# Patient Record
Sex: MALE | Race: BLACK OR AFRICAN AMERICAN | NOT HISPANIC OR LATINO
[De-identification: names, ages, dates, MRNs, and addresses within clinical notes are randomized per-mention and may not be internally consistent; named-entity substitution may affect disease eponyms.]

---

## 2019-08-22 ENCOUNTER — APPOINTMENT (OUTPATIENT)
Dept: SURGERY | Facility: CLINIC | Age: 69
End: 2019-08-22
Payer: MEDICARE

## 2019-08-22 ENCOUNTER — FORM ENCOUNTER (OUTPATIENT)
Age: 69
End: 2019-08-22

## 2019-08-22 VITALS
BODY MASS INDEX: 25.02 KG/M2 | HEIGHT: 68.5 IN | HEART RATE: 79 BPM | WEIGHT: 167 LBS | DIASTOLIC BLOOD PRESSURE: 88 MMHG | SYSTOLIC BLOOD PRESSURE: 161 MMHG

## 2019-08-22 DIAGNOSIS — Z78.9 OTHER SPECIFIED HEALTH STATUS: ICD-10-CM

## 2019-08-22 DIAGNOSIS — Z86.79 PERSONAL HISTORY OF OTHER DISEASES OF THE CIRCULATORY SYSTEM: ICD-10-CM

## 2019-08-22 DIAGNOSIS — Z85.46 PERSONAL HISTORY OF MALIGNANT NEOPLASM OF PROSTATE: ICD-10-CM

## 2019-08-22 DIAGNOSIS — Z87.2 PERSONAL HISTORY OF DISEASES OF THE SKIN AND SUBCUTANEOUS TISSUE: ICD-10-CM

## 2019-08-22 PROCEDURE — 99203 OFFICE O/P NEW LOW 30 MIN: CPT

## 2019-08-22 RX ORDER — SILDENAFIL 100 MG/1
100 TABLET, FILM COATED ORAL
Qty: 6 | Refills: 0 | Status: ACTIVE | COMMUNITY
Start: 2019-07-30

## 2019-08-22 RX ORDER — FEBUXOSTAT 40 MG/1
40 TABLET ORAL
Qty: 30 | Refills: 0 | Status: ACTIVE | COMMUNITY
Start: 2019-08-01

## 2019-08-22 RX ORDER — AMLODIPINE BESYLATE 2.5 MG/1
2.5 TABLET ORAL
Qty: 90 | Refills: 0 | Status: ACTIVE | COMMUNITY
Start: 2019-06-06

## 2019-08-22 RX ORDER — RAMIPRIL 10 MG/1
10 CAPSULE ORAL
Qty: 90 | Refills: 0 | Status: ACTIVE | COMMUNITY
Start: 2019-06-06

## 2019-08-22 RX ORDER — TAMSULOSIN HYDROCHLORIDE 0.4 MG/1
0.4 CAPSULE ORAL
Qty: 90 | Refills: 0 | Status: ACTIVE | COMMUNITY
Start: 2019-08-04

## 2019-08-23 ENCOUNTER — APPOINTMENT (OUTPATIENT)
Dept: CT IMAGING | Facility: IMAGING CENTER | Age: 69
End: 2019-08-23
Payer: MEDICARE

## 2019-08-23 ENCOUNTER — OUTPATIENT (OUTPATIENT)
Dept: OUTPATIENT SERVICES | Facility: HOSPITAL | Age: 69
LOS: 1 days | End: 2019-08-23
Payer: MEDICARE

## 2019-08-23 DIAGNOSIS — Q89.2 CONGENITAL MALFORMATIONS OF OTHER ENDOCRINE GLANDS: ICD-10-CM

## 2019-08-23 PROCEDURE — 70491 CT SOFT TISSUE NECK W/DYE: CPT | Mod: 26

## 2019-08-23 PROCEDURE — 70491 CT SOFT TISSUE NECK W/DYE: CPT

## 2019-08-23 PROCEDURE — 82565 ASSAY OF CREATININE: CPT

## 2019-08-23 NOTE — PHYSICAL EXAM
[de-identified] : 3.5 cm left paramedian upper neck mass overlying thyrohyoid membrane, well circumscribed and mobile [de-identified] : no palpable thyroid nodules [Laryngoscopy Performed] : laryngoscopy was performed, see procedure section for findings [Midline] : located in midline position [Normal] : cranial nerves 2-12 intact [de-identified] : indirect  laryngoscopy shows normal vocal cord mobility bilaterally with no lesions noted

## 2019-08-23 NOTE — CONSULT LETTER
[Dear  ___] : Dear  [unfilled], [Consult Letter:] : I had the pleasure of evaluating your patient, [unfilled]. [Please see my note below.] : Please see my note below. [Consult Closing:] : Thank you very much for allowing me to participate in the care of this patient.  If you have any questions, please do not hesitate to contact me. [Sincerely,] : Sincerely, [FreeTextEntry2] : Dr. Refugio De Leon [FreeTextEntry3] : Josef Hdez MD, FACS\par System Director, Endocrine Surgery\par [facility]\par

## 2019-08-23 NOTE — HISTORY OF PRESENT ILLNESS
[de-identified] : Pt c/o anterior neck mass,  increasing in size.  denies pain, dysphagia or hoarseness, infection or drainage. prior diagnosis of thyroglossal duct cyst

## 2019-09-04 ENCOUNTER — OTHER (OUTPATIENT)
Age: 69
End: 2019-09-04

## 2019-09-19 ENCOUNTER — APPOINTMENT (OUTPATIENT)
Dept: SURGERY | Facility: CLINIC | Age: 69
End: 2019-09-19
Payer: MEDICARE

## 2019-09-19 DIAGNOSIS — Q89.2 CONGENITAL MALFORMATIONS OF OTHER ENDOCRINE GLANDS: ICD-10-CM

## 2019-09-19 PROCEDURE — 99213 OFFICE O/P EST LOW 20 MIN: CPT

## 2019-09-19 NOTE — PHYSICAL EXAM
[de-identified] : 3.5 cm left paramedian upper neck mass overlying thyrohyoid membrane, well circumscribed and mobile.  4 mm right neck intradermal cyst [de-identified] : no palpable thyroid nodules [Laryngoscopy Performed] : laryngoscopy was performed, see procedure section for findings [Midline] : located in midline position [Normal] : orientation to person, place, and time: normal [de-identified] : mild thickening bilateral submandibular salivary glands with clear salivary flow

## 2019-09-19 NOTE — HISTORY OF PRESENT ILLNESS
[de-identified] : prior evaluation of thyroglossal duct cyst, slowly enlarging.  denies pain, infection , dysphagia, hoarseness or new lesions. no changes medically since last visit

## 2019-09-19 NOTE — ASSESSMENT
[FreeTextEntry1] : recommended excision due to enlargement. risks, benefits and alternatives discussed at length. wishes to keep under observation. ramifications discussed. to return earlier if any change.  to maintain hydration to prevent salivary swelling

## 2020-08-13 ENCOUNTER — APPOINTMENT (OUTPATIENT)
Dept: SURGERY | Facility: CLINIC | Age: 70
End: 2020-08-13

## 2022-12-12 ENCOUNTER — EMERGENCY (EMERGENCY)
Facility: HOSPITAL | Age: 72
LOS: 0 days | Discharge: HOME | End: 2022-12-13
Attending: EMERGENCY MEDICINE | Admitting: HOSPITALIST
Payer: MEDICARE

## 2022-12-12 VITALS
HEART RATE: 79 BPM | HEIGHT: 68 IN | OXYGEN SATURATION: 95 % | RESPIRATION RATE: 16 BRPM | DIASTOLIC BLOOD PRESSURE: 83 MMHG | TEMPERATURE: 98 F | WEIGHT: 160.06 LBS | SYSTOLIC BLOOD PRESSURE: 140 MMHG

## 2022-12-12 DIAGNOSIS — M10.9 GOUT, UNSPECIFIED: ICD-10-CM

## 2022-12-12 DIAGNOSIS — I10 ESSENTIAL (PRIMARY) HYPERTENSION: ICD-10-CM

## 2022-12-12 DIAGNOSIS — R55 SYNCOPE AND COLLAPSE: ICD-10-CM

## 2022-12-12 DIAGNOSIS — N18.30 CHRONIC KIDNEY DISEASE, STAGE 3 UNSPECIFIED: ICD-10-CM

## 2022-12-12 DIAGNOSIS — E78.5 HYPERLIPIDEMIA, UNSPECIFIED: ICD-10-CM

## 2022-12-12 DIAGNOSIS — I25.10 ATHEROSCLEROTIC HEART DISEASE OF NATIVE CORONARY ARTERY WITHOUT ANGINA PECTORIS: ICD-10-CM

## 2022-12-12 LAB
ALBUMIN SERPL ELPH-MCNC: 3.6 G/DL — SIGNIFICANT CHANGE UP (ref 3.3–5)
ALP SERPL-CCNC: 65 U/L — SIGNIFICANT CHANGE UP (ref 40–120)
ALT FLD-CCNC: 26 U/L — SIGNIFICANT CHANGE UP (ref 12–78)
ANION GAP SERPL CALC-SCNC: 5 MMOL/L — SIGNIFICANT CHANGE UP (ref 5–17)
APTT BLD: 26.7 SEC — LOW (ref 27.5–35.5)
AST SERPL-CCNC: 34 U/L — SIGNIFICANT CHANGE UP (ref 15–37)
BASOPHILS # BLD AUTO: 0.04 K/UL — SIGNIFICANT CHANGE UP (ref 0–0.2)
BASOPHILS NFR BLD AUTO: 0.5 % — SIGNIFICANT CHANGE UP (ref 0–2)
BILIRUB SERPL-MCNC: 0.6 MG/DL — SIGNIFICANT CHANGE UP (ref 0.2–1.2)
BUN SERPL-MCNC: 26 MG/DL — HIGH (ref 7–23)
CALCIUM SERPL-MCNC: 9.2 MG/DL — SIGNIFICANT CHANGE UP (ref 8.5–10.1)
CHLORIDE SERPL-SCNC: 108 MMOL/L — SIGNIFICANT CHANGE UP (ref 96–108)
CK MB BLD-MCNC: 0.4 % — SIGNIFICANT CHANGE UP (ref 0–3.5)
CK MB CFR SERPL CALC: 6.2 NG/ML — HIGH (ref 0.5–3.6)
CK SERPL-CCNC: 1635 U/L — HIGH (ref 26–308)
CO2 SERPL-SCNC: 29 MMOL/L — SIGNIFICANT CHANGE UP (ref 22–31)
CREAT SERPL-MCNC: 1.67 MG/DL — HIGH (ref 0.5–1.3)
EGFR: 43 ML/MIN/1.73M2 — LOW
EOSINOPHIL # BLD AUTO: 0.04 K/UL — SIGNIFICANT CHANGE UP (ref 0–0.5)
EOSINOPHIL NFR BLD AUTO: 0.5 % — SIGNIFICANT CHANGE UP (ref 0–6)
FLUAV AG NPH QL: SIGNIFICANT CHANGE UP
FLUBV AG NPH QL: SIGNIFICANT CHANGE UP
GLUCOSE BLDC GLUCOMTR-MCNC: 128 MG/DL — HIGH (ref 70–99)
GLUCOSE SERPL-MCNC: 114 MG/DL — HIGH (ref 70–99)
HCT VFR BLD CALC: 40.6 % — SIGNIFICANT CHANGE UP (ref 39–50)
HGB BLD-MCNC: 13.1 G/DL — SIGNIFICANT CHANGE UP (ref 13–17)
IMM GRANULOCYTES NFR BLD AUTO: 0.6 % — SIGNIFICANT CHANGE UP (ref 0–0.9)
INR BLD: 0.93 RATIO — SIGNIFICANT CHANGE UP (ref 0.88–1.16)
LYMPHOCYTES # BLD AUTO: 1.8 K/UL — SIGNIFICANT CHANGE UP (ref 1–3.3)
LYMPHOCYTES # BLD AUTO: 21.5 % — SIGNIFICANT CHANGE UP (ref 13–44)
MCHC RBC-ENTMCNC: 30.8 PG — SIGNIFICANT CHANGE UP (ref 27–34)
MCHC RBC-ENTMCNC: 32.3 G/DL — SIGNIFICANT CHANGE UP (ref 32–36)
MCV RBC AUTO: 95.5 FL — SIGNIFICANT CHANGE UP (ref 80–100)
MONOCYTES # BLD AUTO: 0.52 K/UL — SIGNIFICANT CHANGE UP (ref 0–0.9)
MONOCYTES NFR BLD AUTO: 6.2 % — SIGNIFICANT CHANGE UP (ref 2–14)
NEUTROPHILS # BLD AUTO: 5.92 K/UL — SIGNIFICANT CHANGE UP (ref 1.8–7.4)
NEUTROPHILS NFR BLD AUTO: 70.7 % — SIGNIFICANT CHANGE UP (ref 43–77)
NRBC # BLD: 0 /100 WBCS — SIGNIFICANT CHANGE UP (ref 0–0)
PLATELET # BLD AUTO: 185 K/UL — SIGNIFICANT CHANGE UP (ref 150–400)
POTASSIUM SERPL-MCNC: 4.7 MMOL/L — SIGNIFICANT CHANGE UP (ref 3.5–5.3)
POTASSIUM SERPL-SCNC: 4.7 MMOL/L — SIGNIFICANT CHANGE UP (ref 3.5–5.3)
PROT SERPL-MCNC: 7 GM/DL — SIGNIFICANT CHANGE UP (ref 6–8.3)
PROTHROM AB SERPL-ACNC: 11 SEC — SIGNIFICANT CHANGE UP (ref 10.5–13.4)
RBC # BLD: 4.25 M/UL — SIGNIFICANT CHANGE UP (ref 4.2–5.8)
RBC # FLD: 13 % — SIGNIFICANT CHANGE UP (ref 10.3–14.5)
SARS-COV-2 RNA SPEC QL NAA+PROBE: SIGNIFICANT CHANGE UP
SODIUM SERPL-SCNC: 142 MMOL/L — SIGNIFICANT CHANGE UP (ref 135–145)
TROPONIN I, HIGH SENSITIVITY RESULT: 23.2 NG/L — SIGNIFICANT CHANGE UP
TROPONIN I, HIGH SENSITIVITY RESULT: 40 NG/L — SIGNIFICANT CHANGE UP
WBC # BLD: 8.37 K/UL — SIGNIFICANT CHANGE UP (ref 3.8–10.5)
WBC # FLD AUTO: 8.37 K/UL — SIGNIFICANT CHANGE UP (ref 3.8–10.5)

## 2022-12-12 PROCEDURE — 70450 CT HEAD/BRAIN W/O DYE: CPT | Mod: 26,MA

## 2022-12-12 PROCEDURE — 93880 EXTRACRANIAL BILAT STUDY: CPT | Mod: 26

## 2022-12-12 PROCEDURE — 72100 X-RAY EXAM L-S SPINE 2/3 VWS: CPT | Mod: 26

## 2022-12-12 PROCEDURE — 99285 EMERGENCY DEPT VISIT HI MDM: CPT

## 2022-12-12 PROCEDURE — 99223 1ST HOSP IP/OBS HIGH 75: CPT

## 2022-12-12 PROCEDURE — 71045 X-RAY EXAM CHEST 1 VIEW: CPT | Mod: 26

## 2022-12-12 PROCEDURE — 73502 X-RAY EXAM HIP UNI 2-3 VIEWS: CPT | Mod: 26,RT

## 2022-12-12 PROCEDURE — 93306 TTE W/DOPPLER COMPLETE: CPT | Mod: 26

## 2022-12-12 PROCEDURE — 99222 1ST HOSP IP/OBS MODERATE 55: CPT

## 2022-12-12 PROCEDURE — 93010 ELECTROCARDIOGRAM REPORT: CPT

## 2022-12-12 RX ORDER — ACETAMINOPHEN 500 MG
975 TABLET ORAL ONCE
Refills: 0 | Status: COMPLETED | OUTPATIENT
Start: 2022-12-12 | End: 2022-12-12

## 2022-12-12 RX ORDER — METHOCARBAMOL 500 MG/1
1500 TABLET, FILM COATED ORAL ONCE
Refills: 0 | Status: COMPLETED | OUTPATIENT
Start: 2022-12-12 | End: 2022-12-12

## 2022-12-12 RX ORDER — SODIUM CHLORIDE 9 MG/ML
1000 INJECTION, SOLUTION INTRAVENOUS
Refills: 0 | Status: DISCONTINUED | OUTPATIENT
Start: 2022-12-12 | End: 2022-12-13

## 2022-12-12 RX ORDER — ATORVASTATIN CALCIUM 80 MG/1
40 TABLET, FILM COATED ORAL AT BEDTIME
Refills: 0 | Status: DISCONTINUED | OUTPATIENT
Start: 2022-12-12 | End: 2022-12-13

## 2022-12-12 RX ORDER — METOPROLOL TARTRATE 50 MG
100 TABLET ORAL DAILY
Refills: 0 | Status: DISCONTINUED | OUTPATIENT
Start: 2022-12-12 | End: 2022-12-13

## 2022-12-12 RX ORDER — AMLODIPINE BESYLATE 2.5 MG/1
2.5 TABLET ORAL DAILY
Refills: 0 | Status: DISCONTINUED | OUTPATIENT
Start: 2022-12-12 | End: 2022-12-13

## 2022-12-12 RX ORDER — ACETAMINOPHEN 500 MG
650 TABLET ORAL EVERY 6 HOURS
Refills: 0 | Status: DISCONTINUED | OUTPATIENT
Start: 2022-12-12 | End: 2022-12-13

## 2022-12-12 RX ORDER — LANOLIN ALCOHOL/MO/W.PET/CERES
3 CREAM (GRAM) TOPICAL AT BEDTIME
Refills: 0 | Status: DISCONTINUED | OUTPATIENT
Start: 2022-12-12 | End: 2022-12-13

## 2022-12-12 RX ORDER — ASPIRIN/CALCIUM CARB/MAGNESIUM 324 MG
81 TABLET ORAL DAILY
Refills: 0 | Status: DISCONTINUED | OUTPATIENT
Start: 2022-12-12 | End: 2022-12-13

## 2022-12-12 RX ADMIN — Medication 975 MILLIGRAM(S): at 11:12

## 2022-12-12 RX ADMIN — METHOCARBAMOL 1500 MILLIGRAM(S): 500 TABLET, FILM COATED ORAL at 11:12

## 2022-12-12 RX ADMIN — Medication 81 MILLIGRAM(S): at 18:56

## 2022-12-12 RX ADMIN — SODIUM CHLORIDE 125 MILLILITER(S): 9 INJECTION, SOLUTION INTRAVENOUS at 18:57

## 2022-12-12 RX ADMIN — Medication 650 MILLIGRAM(S): at 22:13

## 2022-12-12 RX ADMIN — AMLODIPINE BESYLATE 2.5 MILLIGRAM(S): 2.5 TABLET ORAL at 18:55

## 2022-12-12 RX ADMIN — Medication 975 MILLIGRAM(S): at 13:15

## 2022-12-12 RX ADMIN — Medication 650 MILLIGRAM(S): at 21:13

## 2022-12-12 RX ADMIN — ATORVASTATIN CALCIUM 40 MILLIGRAM(S): 80 TABLET, FILM COATED ORAL at 21:13

## 2022-12-12 RX ADMIN — Medication 100 MILLIGRAM(S): at 19:00

## 2022-12-12 NOTE — H&P ADULT - NSHPPHYSICALEXAM_GEN_ALL_CORE
CONSTITUTIONAL: alert and cooperative, no acute distress.   EYES: PERRL, no scleral icterus  ENT: Mucosa moist, tongue normal.   NECK: Neck supple, trachea midline, non-tender  CARDIAC: Normal S1 and S2. Regular rate and rhythms. No murmurs. No Pedal edema. Peripheral pulses intact  LUNGS: Clear to auscultation, equal air entry both lungs. No rales, rhonchi, wheezing. Normal respiratory effort.   ABDOMEN: Soft, nondistended, nontender. No guarding or rebound tenderness. No hepatomegaly or splenomegaly. Bowel sound normal.   MUSCULOSKELETAL: Normocephalic, atraumatic. No significant deformity or joint abnormality  NEUROLOGICAL: No gross motor or sensory deficits  SKIN: no lesions or eruptions. Normal turgor  PSYCHIATRIC: A&O x 3, appropriate mood and affect

## 2022-12-12 NOTE — H&P ADULT - ASSESSMENT
72 years old male with h/o HTN, HLD, CAD, h/o prostate cancer, gout present to ED with syncope. Patient had one episode of syncope after bathroom use ( urination). He did not remember the event. Significant others heard a thump in bathroom and when she arrived, patient was trying to get up. Appear slightly confused for half an hour. No h/o seizure. Reported intermittent lightheadedness recently. No exertional chest pain or SOB.   Hemodynamically stable, afebrile, sat well at RA. EKG with NSR. No leukocytosis, plt 185, K 4.7, Cr 1.67, glucose 114, hsTnT 23.2, flu/COVID negative. CT head image reviewed, no acute pathology. CXR image reviewed, no focal consolidation.     Admitted with syncope

## 2022-12-12 NOTE — H&P ADULT - NSVTERISKASSESS_GEN_ALL_CORE FT
Drink plenty of water    follow up if you experiencing kidney pain/fevers/vomiting.        
Medical Assessment Completed on: 12-Dec-2022 12:39

## 2022-12-12 NOTE — H&P ADULT - PROBLEM SELECTOR PLAN 1
present with syncope, no h/o syncope before  CAD, per [facility] document, " CT angio reviewed with 50-70% occusion mid LAD. Despite this patient is asymptomatic with exercise. No intervention at this time but maximal medical management. If patient becomes symptomatic from CAD will reevaluate for intervention" unclear when CTA was done  Serial trop, CK, CKMB, lipid panel, A1c  ECHO, carotid doppler, telemetry monitoring  Check orthostatic vital  Cardiology consulted- Dr Brenner present with syncope, no h/o syncope before  CAD, per [facility] document, " CT angio reviewed with 50-70% occusion mid LAD. Despite this patient is asymptomatic with exercise. No intervention at this time but maximal medical management. If patient becomes symptomatic from CAD will reevaluate for intervention" unclear when CTA was done  Serial trop, CK, CKMB, lipid panel, A1c  ECHO, carotid doppler, telemetry monitoring  Check orthostatic vital  Not on any antiplatelets per patient. Start aspirin 81mg daily   Cardiology consulted- Dr Brenner

## 2022-12-12 NOTE — ED ADULT NURSE NOTE - OBJECTIVE STATEMENT
Received patient from triage found laying supine, head elevated, on stretcher.  Patient states he got up to use the bathroom, urinated, and then had a syncopal episode.  States his family member heard him fall and called EMS.  Patient does not recall falling, and denies any current chest pain, SOB, N/V/D.  Hx HTN, Gout.

## 2022-12-12 NOTE — CONSULT NOTE ADULT - SUBJECTIVE AND OBJECTIVE BOX
CARDIOLOGY CONSULTATION NOTE                                                                               ERIKA ORDONEZ is a 72y Male   HPI:      REVIEW OF SYSTEMS:  -----------------------------    CONSTITUTIONAL: No fever, weight loss, or fatigue  EYES: No eye pain, visual disturbances, or discharge  ENMT:  No difficulty hearing, tinnitus, vertigo; No sinus or throat pain  NECK: No pain or stiffness  BREASTS: No pain, masses, or nipple discharge  RESPIRATORY: No cough, wheezing, chills or hemoptysis; No shortness of breath  CARDIOVASCULAR: See HPI  GASTROINTESTINAL: No abdominal or epigastric pain. No nausea, vomiting, or hematemesis; No diarrhea or constipation. No melena or hematochezia.  GENITOURINARY: No dysuria, frequency, hematuria, or incontinence  NEUROLOGICAL: No headaches, memory loss, loss of strength, numbness, or tremors  SKIN: No itching, burning, rashes, or lesions   LYMPH NODES: No enlarged glands  ENDOCRINE: No heat or cold intolerance; No hair loss  MUSCULOSKELETAL: No joint pain or swelling; No muscle, back, or extremity pain  PSYCHIATRIC: No depression, anxiety, mood swings, or difficulty sleeping  HEME/LYMPH: No easy bruising, or bleeding gums  ALLERGY AND IMMUNOLOGIC: No hives or eczema    Home Medications:  amLODIPine 2.5 mg oral tablet: 1 tab(s) orally once a day (12 Dec 2022 12:41)  Metoprolol Succinate ER 50 mg oral tablet, extended release: 2 tab(s) orally once a day (12 Dec 2022 12:42)  ramipril 10 mg oral capsule: 1 cap(s) orally once a day (12 Dec 2022 12:42)  rosuvastatin 10 mg oral tablet: 1 tab(s) orally once a day (12 Dec 2022 12:42)      MEDICATIONS  (STANDING):  amLODIPine   Tablet 2.5 milliGRAM(s) Oral daily  aspirin enteric coated 81 milliGRAM(s) Oral daily  atorvastatin 40 milliGRAM(s) Oral at bedtime  metoprolol succinate  milliGRAM(s) Oral daily      ALLERGIES: No Known Allergies      FAMILY HISTORY:      PHYSICAL EXAMINATION:  -----------------------------  T(C): 36.4 (12-12-22 @ 07:07), Max: 36.4 (12-12-22 @ 07:07)  HR: 79 (12-12-22 @ 07:07) (79 - 79)  BP: 140/83 (12-12-22 @ 07:07) (140/83 - 140/83)  RR: 16 (12-12-22 @ 07:07) (16 - 16)  SpO2: 95% (12-12-22 @ 07:07) (95% - 95%)  Wt(kg): --    Height (cm): 172.7 (12-12 @ 07:07)  Weight (kg): 72.6 (12-12 @ 07:07)  BMI (kg/m2): 24.3 (12-12 @ 07:07)  BSA (m2): 1.86 (12-12 @ 07:07)    Constitutional: well developed, normal appearance, well groomed, well nourished, no deformities and no acute distress.   Eyes: the conjunctiva exhibited no abnormalities and the eyelids demonstrated no xanthelasmas.   HEENT: normal oral mucosa, no oral pallor and no oral cyanosis.   Neck: normal jugular venous A waves present, normal jugular venous V waves present and no jugular venous ortiz A waves.   Pulmonary: no respiratory distress, normal respiratory rhythm and effort, no accessory muscle use and lungs were clear to auscultation bilaterally.   Cardiovascular: heart rate and rhythm were normal, normal S1 and S2 and no murmur, gallop, rub, heave or thrill are present.   Abdomen: soft, non-tender, no hepato-splenomegaly and no abdominal mass palpated.   Musculoskeletal: the gait could not be assessed..   Extremities: no clubbing of the fingernails, no localized cyanosis, no petechial hemorrhages and no ischemic changes.   Skin: normal skin color and pigmentation, no rash, no venous stasis, no skin lesions, no skin ulcer and no xanthoma was observed.   Psychiatric: oriented to person, place, and time, the affect was normal, the mood was normal and not feeling anxious.     ECG:  -------        LABS:   --------  12-12    142  |  108  |  26<H>  ----------------------------<  114<H>  4.7   |  29  |  1.67<H>    Ca    9.2      12 Dec 2022 10:40    TPro  7.0  /  Alb  3.6  /  TBili  0.6  /  DBili  x   /  AST  34  /  ALT  26  /  AlkPhos  65  12-12                         13.1   8.37  )-----------( 185      ( 12 Dec 2022 10:40 )             40.6     PT/INR - ( 12 Dec 2022 10:40 )   PT: 11.0 sec;   INR: 0.93 ratio         PTT - ( 12 Dec 2022 10:40 )  PTT:26.7 sec            RADIOLOGY REPORTS:  -----------------------------        ECHOCARDIOGRAM:  ---------------------------        CARDIOLOGY CONSULTATION NOTE                                                                             72 years old male with h/o HTN, HLD, CAD, h/o prostate cancer, gout present to ED with syncope. Patient had one episode of syncope after bathroom use ( urination). He did not remember the event. Significant others heard a thump in bathroom and when she arrived, patient was trying to get up. Appear slightly confused for half an hour. No h/o seizure. Reported intermittent lightheadedness recently. No exertional chest pain or SOB.  Hemodynamically stable, afebrile, sat well at RA. EKG with NSR. No leukiocytosis, plt 185, K 4.7, Cr 1.67, glucose 114, hsTnT 23.2, flu/COVID negative. CT head image reviewed, no acute pathology. CXR image reviewed, no focal consolidation.   Patient normally follows at [facility]. Per Jillian ABEL, there is a document stating " CT angio reviewed with 50-70% occlusion mid LAD; despite this patient is asymptomatic with exercise; discussed with patient; no intervention at this time but maximal medical management, if patient becomes symptomatic from CAD will reevaluate for intervention". Unclear when CTA was done  SH: marijuana/alcohol use occasionally  FH: no family h/o premature CAD  REVIEW OF SYSTEMS: -----------------------------  CONSTITUTIONAL: No fever, weight loss, or fatigue EYES: No eye pain, visual disturbances, or discharge ENMT:  No difficulty hearing, tinnitus, vertigo; No sinus or throat pain NECK: No pain or stiffness BREASTS: No pain, masses, or nipple discharge RESPIRATORY: No cough, wheezing, chills or hemoptysis; No shortness of breath CARDIOVASCULAR: See HPI GASTROINTESTINAL: No abdominal or epigastric pain. No nausea, vomiting, or hematemesis; No diarrhea or constipation. No melena or hematochezia. GENITOURINARY: No dysuria, frequency, hematuria, or incontinence NEUROLOGICAL: No headaches, memory loss, loss of strength, numbness, or tremors SKIN: No itching, burning, rashes, or lesions  LYMPH NODES: No enlarged glands ENDOCRINE: No heat or cold intolerance; No hair loss MUSCULOSKELETAL: No joint pain or swelling; No muscle, back, or extremity pain PSYCHIATRIC: No depression, anxiety, mood swings, or difficulty sleeping HEME/LYMPH: No easy bruising, or bleeding gums ALLERGY AND IMMUNOLOGIC: No hives or eczema  Home Medications: amLODIPine 2.5 mg oral tablet: 1 tab(s) orally once a day (12 Dec 2022 12:41) Metoprolol Succinate ER 50 mg oral tablet, extended release: 2 tab(s) orally once a day (12 Dec 2022 12:42) ramipril 10 mg oral capsule: 1 cap(s) orally once a day (12 Dec 2022 12:42) rosuvastatin 10 mg oral tablet: 1 tab(s) orally once a day (12 Dec 2022 12:42)   MEDICATIONS  (STANDING): amLODIPine   Tablet 2.5 milliGRAM(s) Oral daily aspirin enteric coated 81 milliGRAM(s) Oral daily atorvastatin 40 milliGRAM(s) Oral at bedtime metoprolol succinate  milliGRAM(s) Oral daily   ALLERGIES: No Known Allergies   FAMILY HISTORY:   PHYSICAL EXAMINATION: ----------------------------- T(C): 36.4 (12-12-22 @ 07:07), Max: 36.4 (12-12-22 @ 07:07) HR: 79 (12-12-22 @ 07:07) (79 - 79) BP: 140/83 (12-12-22 @ 07:07) (140/83 - 140/83) RR: 16 (12-12-22 @ 07:07) (16 - 16) SpO2: 95% (12-12-22 @ 07:07) (95% - 95%) Wt(kg): --  Height (cm): 172.7 (12-12 @ 07:07) Weight (kg): 72.6 (12-12 @ 07:07) BMI (kg/m2): 24.3 (12-12 @ 07:07) BSA (m2): 1.86 (12-12 @ 07:07)  Constitutional: well developed, normal appearance, well groomed, well nourished, no deformities and no acute distress.  Eyes: the conjunctiva exhibited no abnormalities and the eyelids demonstrated no xanthelasmas.  HEENT: normal oral mucosa, no oral pallor and no oral cyanosis.  Neck: normal jugular venous A waves present, normal jugular venous V waves present and no jugular venous ortiz A waves.  Pulmonary: no respiratory distress, normal respiratory rhythm and effort, no accessory muscle use and lungs were clear to auscultation bilaterally.  Cardiovascular: heart rate and rhythm were normal, normal S1 and S2 and no murmur, gallop, rub, heave or thrill are present.  Abdomen: soft, non-tender, no hepato-splenomegaly and no abdominal mass palpated.  Musculoskeletal: the gait could not be assessed..  Extremities: no clubbing of the fingernails, no localized cyanosis, no petechial hemorrhages and no ischemic changes.  Skin: normal skin color and pigmentation, no rash, no venous stasis, no skin lesions, no skin ulcer and no xanthoma was observed.  Psychiatric: oriented to person, place, and time, the affect was normal, the mood was normal and not feeling anxious.   ECG: -------    LABS:  -------- 12-12  142  |  108  |  26<H> ----------------------------<  114<H> 4.7   |  29  |  1.67<H>  Ca    9.2      12 Dec 2022 10:40  TPro  7.0  /  Alb  3.6  /  TBili  0.6  /  DBili  x   /  AST  34  /  ALT  26  /  AlkPhos  65  12-12                       13.1  8.37  )-----------( 185      ( 12 Dec 2022 10:40 )            40.6    PT/INR - ( 12 Dec 2022 10:40 )   PT: 11.0 sec;   INR: 0.93 ratio      PTT - ( 12 Dec 2022 10:40 )  PTT:26.7 sec      RADIOLOGY REPORTS: -----------------------------    ECHOCARDIOGRAM: ---------------------------      CARDIOLOGY CONSULTATION NOTE                                                                             72 years old male with h/o HTN, HLD, CAD, h/o prostate cancer, gout present to ED with syncope. Patient had one episode of syncope after bathroom use ( urination). He did not remember the event. Significant others heard a thump in bathroom and when she arrived, patient was trying to get up. Appear slightly confused for half an hour. No h/o seizure. Reported intermittent lightheadedness recently. No exertional chest pain or SOB.  Hemodynamically stable, afebrile, sat well at RA. EKG with NSR. No leukocytosis, plt 185, K 4.7, Cr 1.67, glucose 114, hsTnT 23.2, flu/COVID negative. CT head image reviewed, no acute pathology. CXR image reviewed, no focal consolidation.   Patient normally follows at [facility]. Per Jillian ABEL, there is a document stating " CT angio reviewed with 50-70% occlusion mid LAD; despite this patient is asymptomatic with exercise; discussed with patient; no intervention at this time but maximal medical management, if patient becomes symptomatic from CAD will reevaluate for intervention". Unclear when CTA was done  Otherwise quite active, avikalen prado. No other focal complaints. Follows with Dr. Refugio De Leon.  SH: marijuana/alcohol use occasionally  FH: no family h/o premature CAD  REVIEW OF SYSTEMS: -----------------------------  CONSTITUTIONAL: No fever, weight loss, or fatigue EYES: No eye pain, visual disturbances, or discharge ENMT:  No difficulty hearing, tinnitus, vertigo; No sinus or throat pain NECK: No pain or stiffness BREASTS: No pain, masses, or nipple discharge RESPIRATORY: No cough, wheezing, chills or hemoptysis; No shortness of breath CARDIOVASCULAR: See HPI GASTROINTESTINAL: No abdominal or epigastric pain. No nausea, vomiting, or hematemesis; No diarrhea or constipation. No melena or hematochezia. GENITOURINARY: No dysuria, frequency, hematuria, or incontinence NEUROLOGICAL: No headaches, memory loss, loss of strength, numbness, or tremors SKIN: No itching, burning, rashes, or lesions  LYMPH NODES: No enlarged glands ENDOCRINE: No heat or cold intolerance; No hair loss MUSCULOSKELETAL: No joint pain or swelling; No muscle, back, or extremity pain PSYCHIATRIC: No depression, anxiety, mood swings, or difficulty sleeping HEME/LYMPH: No easy bruising, or bleeding gums ALLERGY AND IMMUNOLOGIC: No hives or eczema  Home Medications: amLODIPine 2.5 mg oral tablet: 1 tab(s) orally once a day (12 Dec 2022 12:41) Metoprolol Succinate ER 50 mg oral tablet, extended release: 2 tab(s) orally once a day (12 Dec 2022 12:42) ramipril 10 mg oral capsule: 1 cap(s) orally once a day (12 Dec 2022 12:42) rosuvastatin 10 mg oral tablet: 1 tab(s) orally once a day (12 Dec 2022 12:42)   MEDICATIONS  (STANDING): amLODIPine   Tablet 2.5 milliGRAM(s) Oral daily aspirin enteric coated 81 milliGRAM(s) Oral daily atorvastatin 40 milliGRAM(s) Oral at bedtime metoprolol succinate  milliGRAM(s) Oral daily   ALLERGIES: No Known Allergies   FAMILY HISTORY: NC   PHYSICAL EXAMINATION: ----------------------------- T(C): 36.4 (22 @ 07:07), Max: 36.4 (22 @ 07:07) HR: 79 (22 @ 07:07) (79 - 79) BP: 140/83 (22 @ 07:07) (140/83 - 140/83) RR: 16 (22 @ 07:07) (16 - 16) SpO2: 95% (22 @ 07:07) (95% - 95%) Wt(kg): --  Height (cm): 172.7 ( 07:07) Weight (kg): 72.6 ( 07:07) BMI (kg/m2): 24.3 (:07) BSA (m2): 1.86 (:07)  Constitutional: well developed, normal appearance, well groomed, well nourished, no deformities and no acute distress.  Eyes: the conjunctiva exhibited no abnormalities and the eyelids demonstrated no xanthelasmas.  HEENT: normal oral mucosa, no oral pallor and no oral cyanosis.  Neck: normal jugular venous A waves present, normal jugular venous V waves present and no jugular venous ortiz A waves.  Pulmonary: no respiratory distress, normal respiratory rhythm and effort, no accessory muscle use and lungs were clear to auscultation bilaterally.  Cardiovascular: heart rate and rhythm were normal, normal S1 and S2 and no gallop, rub, heave or thrill are present. 2/6 systolic ejection murmur appreciated in LLSB, non radiating. Abdomen: soft, non-tender, no hepato-splenomegaly and no abdominal mass palpated.  Musculoskeletal: the gait could not be assessed..  Extremities: no clubbing of the fingernails, no localized cyanosis, no petechial hemorrhages and no ischemic changes.  Skin: normal skin color and pigmentation, no rash, no venous stasis, no skin lesions, no skin ulcer and no xanthoma was observed.  Psychiatric: oriented to person, place, and time, the affect was normal, the mood was normal and not feeling anxious.   ECG: ------- `< from: 12 Lead ECG (22 @ 07:34) >  Ventricular Rate 71 BPM  Atrial Rate 71 BPM  P-R Interval 140 ms  QRS Duration 94 ms  Q-T Interval 410 ms  QTC Calculation(Bazett) 445 ms  P Axis 63 degrees  R Axis -12 degrees  T Axis -9 degrees  Diagnosis Line Normal sinus rhythm Minimal voltage criteria for LVH, may be normal variant Borderline ECG No previous ECGs available Confirmed by Misael Brenner MD ([id number]) on 2022 10:21:38 PM  < end of copied text >    LABS:  --------   142  |  108  |  26<H> ----------------------------<  114<H> 4.7   |  29  |  1.67<H>  Ca    9.2      12 Dec 2022 10:40  TPro  7.0  /  Alb  3.6  /  TBili  0.6  /  DBili  x   /  AST  34  /  ALT  26  /  AlkPhos  65                         13.1  8.37  )-----------( 185      ( 12 Dec 2022 10:40 )            40.6    PT/INR - ( 12 Dec 2022 10:40 )   PT: 11.0 sec;   INR: 0.93 ratio      PTT - ( 12 Dec 2022 10:40 )  PTT:26.7 sec      RADIOLOGY REPORTS: -----------------------------    ECHOCARDIOGRAM: ---------------------------  < from: TTE Echo Complete w/o Contrast w/ Doppler (22 @ 17:37) >  ACC: [id number] EXAM:  ECHO TTE WO CON COMP W DOPP                        PROCEDURE DATE:  2022      INTERPRETATION:  TRANSTHORACIC ECHOCARDIOGRAM REPORT    Patient Name:   ERIKA ORDONEZ Patient Location: [facility] Rec #:  [id number]     Accession #:      [id number] Account #:                     Height:           67.7 in 172.0 cm YOB: 1950       Weight:           160.9 lb 73.00 kg Patient Age:    72 years       BSA:              1.86 m² Patient Gender: M      BP:               140/83 mmHg   Date of Exam:        2022 5:37:56 PM Sonographer:         STACIA Referring Physician: MANSI ANTONIO  Procedure:   2D Echo/Doppler/Color Doppler Complete. Indications: Abnormal electrocardiogram [ECG] [EKG] - R94.31 Diagnosis:   Abnormal electrocardiogram [ECG] [EKG] - R94.31    2D AND M-MODE MEASUREMENTS (normal ranges within parentheses): Left                 Normal   Aorta/Left            Normal Ventricle:                    Atrium: IVSd(2D):    1.12  (0.7-1.1) Aortic Root    3.70  (2.4-3.7)                cm             (2D):           cm LVPWd (2D):   1.13  (0.7-1.1) Left Atrium    3.96  (1.9-4.0)                cm             (2D):           cm LVIDd (2D):   5.39  (3.4-5.7) LA Vol Index   62.9                cm             (A4C):        ml/m² LVIDs (2D):   4.03            LA Vol Index   79.8                cm             (A2C):        ml/m² LV FS (2D):   25.2   (>25%)   LA Vol Index   74.3                 %  (BP):         ml/m² Relative Wall 0.42   (<0.42)  Right Thickness                     Ventricle:                               TAPSE:          2.25 cm  LV DIASTOLIC FUNCTION: MV Peak E: 0.73 m/s Decel Time:  199 msec MV Peak A: 0.89 m/s Septal E/e'  15.2 E/A Ratio: 0.82     Lateral E/e' 10.4 Septal e'  0.0 m/s Lateral e' 0.1 m/s  SPECTRAL DOPPLER ANALYSIS (where applicable): Mitral Valve: MV P1/2 Time: 57.68 msec MV Area, PHT: 3.81 cm²  Aortic Valve: AoV Max Jeremiah: 1.66 m/s AoV Peak P.1 mmHg AoV Mean P.0 mmHg  LVOT Vmax: 0.79 m/s LVOT VTI: 0.168 m LVOT Diameter: 2.19 cm  AoV Area, Vmax: 1.79 cm² AoV Area, VTI: 2.02 cm² AoV Area, Vmn: 1.60 cm²  Aortic Insufficiency: AI Half-time:  641 msec AI Decel Rate: 2.17 m/s²  Tricuspid Valve and PA/RV Systolic Pressure: TR Max Velocity: 2.32 m/s RA  Pressure: 5 mmHg RVSP/PASP: 26.5 mmHg   PHYSICIAN INTERPRETATION: Left Ventricle: Normal left ventricular size and wall thicknesses, with  normal systolic and diastolic function. Global LV systolic function was normal. Left ventricular ejection  fraction, by visual estimation, is 55 to 60%. Right Ventricle: Normal right ventricular size and function. Left Atrium: Moderately enlarged left atrium. Right Atrium: The right atrium is normal in size. Pericardium: There is no evidence of pericardial effusion. Mitral Valve: Structurally normal mitral valve, with normal leaflet  excursion. Moderate mitral valve regurgitation is seen. Tricuspid Valve: Structurally normal tricuspid valve, with normal leaflet  excursion. Mild tricuspid regurgitation is visualized. Aortic Valve: Normal trileaflet aortic valve with normal opening.  Moderate aortic valve regurgitation is seen. Pulmonic Valve: Structurally normal pulmonic valve, with normal leaflet  excursion. Mild pulmonic valve regurgitation. Aorta: The aortic root and ascending aorta are structurally normal, with  no evidence of dilitation. Pulmonary Artery: The main pulmonary artery is normal in size.   Summary:  1. Left ventricular ejection fraction, by visual estimation, is 55 to  60%.  2. Normal global left ventricular systolic function.  3. Moderately enlarged left atrium.  4. Moderate mitral valve regurgitation.  5. Mild tricuspid regurgitation.  6. Moderate aortic regurgitation.  7. Mild pulmonic valve regurgitation.   [id number] Misael Brenner MD, [facility] Electronically signed on 2022 at 9:41:46 PM      *** Final ***  < end of copied text >

## 2022-12-12 NOTE — ED PROVIDER NOTE - OBJECTIVE STATEMENT
72 year old male with PMH of HTN, HLD, Gout hx otherwise presenting to ED after episode of unconsciousness this morning - girlfriend states that she was making coffee when she heard a thump in the bathroom. Immediate arriving to find pt trying to get up - was unable to do so and exhibited 30 minutes of confusion thereafter. Pt currently in triage A&Ox3 and otherwise denies any discomfort. Denies any CP or palpitations, states he had been having mild nonspecific weakness/dizziness for past year.

## 2022-12-12 NOTE — H&P ADULT - PROBLEM SELECTOR PLAN 2
Follows at [facility]  CAD, per [facility] document, " CT angio reviewed with 50-70% occusion mid LAD. Despite this patient is asymptomatic with exercise. No intervention at this time but maximal medical management. If patient becomes symptomatic from CAD will reevaluate for intervention" unclear when CTA was done  ECHO, carotid doppler, telemetry monitoring  Cardiology consulted- Dr Brenner Follows at [facility]  CAD, per [facility] document, " CT angio reviewed with 50-70% occusion mid LAD. Despite this patient is asymptomatic with exercise. No intervention at this time but maximal medical management. If patient becomes symptomatic from CAD will reevaluate for intervention" unclear when CTA was done  ECHO, carotid doppler, telemetry monitoring  Not on any antiplatelets per patient. Start aspirin 81mg daily, continue statin  Cardiology consulted- Dr Brenner

## 2022-12-12 NOTE — ED ADULT TRIAGE NOTE - CHIEF COMPLAINT QUOTE
biba,  pt found on floor by wife, pt does not recall falling.   wife found him trying to get up.   unknown if hit head.  denies anticoag.  per EMS pt was confused,  .  pt ambulated into ED.  pt c/o R-hip pain.  (PMH-HTN).  pt aaox4 in triage.

## 2022-12-12 NOTE — H&P ADULT - PROBLEM SELECTOR PLAN 4
Fax received from [employer] to report Omeprazole DR 20 mg Capsule has a quantity limit.  They authorized a temporary 30-day supply of the medication on 2/24/17.  Insurance is requesting that we call [employer] at [phone] for a coverage review for this medication.     omeprazole (PRILOSEC) 20 MG capsule 180 capsule 3 2/24/2017       Sig - Route: Take 2 capsules by mouth daily. - Oral      I called insurance and representative reports medication, Omeprazole 20 mg, would be covered if she was taking one pill daily versus 2 capsules daily.  I inquired if Omeprazole 40 mg take one capsule daily would be covered by patient's insurance and she states it is if it is for one capsule daily.      I called patient who reports she has been taking Omeprazole 20 mg, one capsule before lunch and one capsule before supper.  I informed her of the information from the insurance and she is in agreement to try Omeprazole 40 mg one capsule daily.  I informed her that Dr. Browne is out of the office today, but he will address this tomorrow when he is back in the office.  Patient verbalized appreciation.    Dr. Browne, the updated script is pending your approval, which will be covered by patient's insurance.  
Patient notified the script was sent to her local pharmacy, [facility].  
Script done,should [facility]?  
Per document, has CKD 3, no Cr in system  Monitor renal function  If Cr stable and BP elevated, resume ACEI

## 2022-12-12 NOTE — H&P ADULT - HISTORY OF PRESENT ILLNESS
72 years old male with h/o HTN, HLD, CAD, h/o prostate cancer, gout present to ED with syncope. Patient had one episode of syncope after bathroom use ( urination). He did not remember the event. Significant others heard a thump in bathroom and when she arrived, patient was trying to get up. Appear slightly confused for half an hour. No h/o seizure. Reported intermittent lightheadedness recently. No exertional chest pain or SOB.   Hemodynamically stable, afebrile, sat well at RA. EKG with NSR. No leukiocytosis, plt 185, K 4.7, Cr 1.67, glucose 114, hsTnT 23.2, flu/COVID negative. CT head image reviewed, no acute pathology. CXR image reviewed, no focal consolidation.     Patient normally follows at [facility]. Per Jillian ABEL, there is a document stating " CT angio reviewed with 50-70% occlusion mid LAD; despite this patient is asymptomatic with exercise; discussed with patient; no intervention at this time but maximal medical management, if patient becomes symptomatic from CAD will reevaluate for intervention". Unclear when CTA was done    SH: marijuana/alcohol use occasionally   FH: no family h/o premature CAD

## 2022-12-12 NOTE — CONSULT NOTE ADULT - ASSESSMENT
The chart has been reviewed but the patient has not yet been examined.  Full note to follow. 71 yo male with possible micturition syncope. Questionable history of CAD on CTA (source unclear), no active ischemic changes on ECG.  Echo shows normal LVEF but has moderate MR. Carotd study unremarkable.  H/O HTN, HLD.    Admit for 24 hours observation and tele monitoring.  Lengthy syncope doubt ischemic or arrhythmia, neuro evaluation to r/o seizure?  Ambulate and assess for orthostatics.  Out patient ischemia evaluation if ECG and trops normal.

## 2022-12-12 NOTE — ED PROVIDER NOTE - CLINICAL SUMMARY MEDICAL DECISION MAKING FREE TEXT BOX
pt with syncope noted without prodrome and otherwise pt feeling well in ED - will workup  - pt with syncope noted without prodrome and otherwise pt feeling well in ED - will workup  - and will admit for further care with Dr Herring

## 2022-12-13 ENCOUNTER — TRANSCRIPTION ENCOUNTER (OUTPATIENT)
Age: 72
End: 2022-12-13

## 2022-12-13 VITALS
HEART RATE: 78 BPM | RESPIRATION RATE: 20 BRPM | TEMPERATURE: 98 F | SYSTOLIC BLOOD PRESSURE: 158 MMHG | DIASTOLIC BLOOD PRESSURE: 99 MMHG

## 2022-12-13 LAB
A1C WITH ESTIMATED AVERAGE GLUCOSE RESULT: 4.7 % — SIGNIFICANT CHANGE UP (ref 4–5.6)
ALBUMIN SERPL ELPH-MCNC: 3.2 G/DL — LOW (ref 3.3–5)
ALP SERPL-CCNC: 62 U/L — SIGNIFICANT CHANGE UP (ref 40–120)
ALT FLD-CCNC: 22 U/L — SIGNIFICANT CHANGE UP (ref 12–78)
ANION GAP SERPL CALC-SCNC: 5 MMOL/L — SIGNIFICANT CHANGE UP (ref 5–17)
APPEARANCE UR: CLEAR — SIGNIFICANT CHANGE UP
AST SERPL-CCNC: 68 U/L — HIGH (ref 15–37)
BACTERIA # UR AUTO: ABNORMAL
BILIRUB SERPL-MCNC: 1.2 MG/DL — SIGNIFICANT CHANGE UP (ref 0.2–1.2)
BILIRUB UR-MCNC: NEGATIVE — SIGNIFICANT CHANGE UP
BUN SERPL-MCNC: 19 MG/DL — SIGNIFICANT CHANGE UP (ref 7–23)
CALCIUM SERPL-MCNC: 8.4 MG/DL — LOW (ref 8.5–10.1)
CHLORIDE SERPL-SCNC: 108 MMOL/L — SIGNIFICANT CHANGE UP (ref 96–108)
CHOLEST SERPL-MCNC: 162 MG/DL — SIGNIFICANT CHANGE UP
CK MB BLD-MCNC: 0.2 % — SIGNIFICANT CHANGE UP (ref 0–3.5)
CK MB CFR SERPL CALC: 3.8 NG/ML — HIGH (ref 0.5–3.6)
CK SERPL-CCNC: 2025 U/L — HIGH (ref 26–308)
CO2 SERPL-SCNC: 27 MMOL/L — SIGNIFICANT CHANGE UP (ref 22–31)
COLOR SPEC: YELLOW — SIGNIFICANT CHANGE UP
CREAT SERPL-MCNC: 1.43 MG/DL — HIGH (ref 0.5–1.3)
DIFF PNL FLD: NEGATIVE — SIGNIFICANT CHANGE UP
EGFR: 52 ML/MIN/1.73M2 — LOW
EPI CELLS # UR: SIGNIFICANT CHANGE UP
ESTIMATED AVERAGE GLUCOSE: 88 MG/DL — SIGNIFICANT CHANGE UP (ref 68–114)
GLUCOSE SERPL-MCNC: 73 MG/DL — SIGNIFICANT CHANGE UP (ref 70–99)
GLUCOSE UR QL: NEGATIVE MG/DL — SIGNIFICANT CHANGE UP
HCT VFR BLD CALC: 38.9 % — LOW (ref 39–50)
HCV AB S/CO SERPL IA: 0.08 S/CO — SIGNIFICANT CHANGE UP (ref 0–0.99)
HCV AB SERPL-IMP: SIGNIFICANT CHANGE UP
HDLC SERPL-MCNC: 46 MG/DL — SIGNIFICANT CHANGE UP
HGB BLD-MCNC: 12.6 G/DL — LOW (ref 13–17)
KETONES UR-MCNC: ABNORMAL
LEUKOCYTE ESTERASE UR-ACNC: NEGATIVE — SIGNIFICANT CHANGE UP
LIPID PNL WITH DIRECT LDL SERPL: 99 MG/DL — SIGNIFICANT CHANGE UP
MAGNESIUM SERPL-MCNC: 2 MG/DL — SIGNIFICANT CHANGE UP (ref 1.6–2.6)
MCHC RBC-ENTMCNC: 30.9 PG — SIGNIFICANT CHANGE UP (ref 27–34)
MCHC RBC-ENTMCNC: 32.4 G/DL — SIGNIFICANT CHANGE UP (ref 32–36)
MCV RBC AUTO: 95.3 FL — SIGNIFICANT CHANGE UP (ref 80–100)
NITRITE UR-MCNC: NEGATIVE — SIGNIFICANT CHANGE UP
NON HDL CHOLESTEROL: 116 MG/DL — SIGNIFICANT CHANGE UP
NRBC # BLD: 0 /100 WBCS — SIGNIFICANT CHANGE UP (ref 0–0)
PH UR: 6 — SIGNIFICANT CHANGE UP (ref 5–8)
PHOSPHATE SERPL-MCNC: 3.3 MG/DL — SIGNIFICANT CHANGE UP (ref 2.5–4.5)
PLATELET # BLD AUTO: 190 K/UL — SIGNIFICANT CHANGE UP (ref 150–400)
POTASSIUM SERPL-MCNC: 4 MMOL/L — SIGNIFICANT CHANGE UP (ref 3.5–5.3)
POTASSIUM SERPL-SCNC: 4 MMOL/L — SIGNIFICANT CHANGE UP (ref 3.5–5.3)
PROT SERPL-MCNC: 6.3 GM/DL — SIGNIFICANT CHANGE UP (ref 6–8.3)
PROT UR-MCNC: 30 MG/DL
RBC # BLD: 4.08 M/UL — LOW (ref 4.2–5.8)
RBC # FLD: 13.1 % — SIGNIFICANT CHANGE UP (ref 10.3–14.5)
RBC CASTS # UR COMP ASSIST: SIGNIFICANT CHANGE UP /HPF (ref 0–4)
SODIUM SERPL-SCNC: 140 MMOL/L — SIGNIFICANT CHANGE UP (ref 135–145)
SP GR SPEC: 1.01 — SIGNIFICANT CHANGE UP (ref 1.01–1.02)
TRIGL SERPL-MCNC: 86 MG/DL — SIGNIFICANT CHANGE UP
TROPONIN I, HIGH SENSITIVITY RESULT: 43.4 NG/L — SIGNIFICANT CHANGE UP
UROBILINOGEN FLD QL: 4 MG/DL
WBC # BLD: 7.92 K/UL — SIGNIFICANT CHANGE UP (ref 3.8–10.5)
WBC # FLD AUTO: 7.92 K/UL — SIGNIFICANT CHANGE UP (ref 3.8–10.5)
WBC UR QL: SIGNIFICANT CHANGE UP

## 2022-12-13 PROCEDURE — 99239 HOSP IP/OBS DSCHRG MGMT >30: CPT

## 2022-12-13 PROCEDURE — 99233 SBSQ HOSP IP/OBS HIGH 50: CPT

## 2022-12-13 RX ORDER — ASPIRIN/CALCIUM CARB/MAGNESIUM 324 MG
1 TABLET ORAL
Qty: 0 | Refills: 0 | DISCHARGE
Start: 2022-12-13

## 2022-12-13 RX ADMIN — Medication 100 MILLIGRAM(S): at 10:59

## 2022-12-13 RX ADMIN — AMLODIPINE BESYLATE 2.5 MILLIGRAM(S): 2.5 TABLET ORAL at 10:54

## 2022-12-13 RX ADMIN — Medication 81 MILLIGRAM(S): at 12:20

## 2022-12-13 NOTE — DISCHARGE NOTE PROVIDER - HOSPITAL COURSE
HPI:  72 years old male with h/o HTN, HLD, CAD, h/o prostate cancer, gout present to ED with syncope. Patient had one episode of syncope after bathroom use ( urination). He did not remember the event. Significant others heard a thump in bathroom and when she arrived, patient was trying to get up. Appear slightly confused for half an hour. No h/o seizure. Reported intermittent lightheadedness recently. No exertional chest pain or SOB.   Hemodynamically stable, afebrile, sat well at [facility]. EKG with NSR. No leukiocytosis, plt 185, K 4.7, Cr 1.67, glucose 114, hsTnT 23.2, flu/COVID negative. CT head image reviewed, no acute pathology. CXR image reviewed, no focal consolidation.     Patient normally follows at [facility]. Per Jillian ABEL, there is a document stating " CT angio reviewed with 50-70% occlusion mid LAD; despite this patient is asymptomatic with exercise; discussed with patient; no intervention at this time but maximal medical management, if patient becomes symptomatic from CAD will reevaluate for intervention". Unclear when CTA was done    SH: marijuana/alcohol use occasionally   FH: no family h/o premature CAD (12 Dec 2022 13:24)  2 yo male with possible micturition syncope. Questionable history of CAD on CTA (source unclear), no active ischemic changes on ECG.  Echo shows normal LVEF but has moderate MR/AR.   Carotid study unremarkable.  H/O HTN, HLD, prostate Ca    -No events on tele, remains in sinus  -?neuro evaluation   -assess for orthostatics.  -Out patient ischemia

## 2022-12-13 NOTE — DISCHARGE NOTE PROVIDER - CARE PROVIDERS DIRECT ADDRESSES
,DirectAddress_Unknown,[email or web address], [email or web address]

## 2022-12-13 NOTE — DISCHARGE NOTE PROVIDER - NSDCMRMEDTOKEN_GEN_ALL_CORE_FT
amLODIPine 2.5 mg oral tablet: 1 tab(s) orally once a day  aspirin 81 mg oral delayed release tablet: 1 tab(s) orally once a day  Metoprolol Succinate ER 50 mg oral tablet, extended release: 2 tab(s) orally once a day  ramipril 10 mg oral capsule: 1 cap(s) orally once a day  rosuvastatin 10 mg oral tablet: 1 tab(s) orally once a day

## 2022-12-13 NOTE — DISCHARGE NOTE PROVIDER - NSDCCPCAREPLAN_GEN_ALL_CORE_FT
PRINCIPAL DISCHARGE DIAGNOSIS  Diagnosis: Syncope  Assessment and Plan of Treatment: secondary to urination likely vasovagal please see a neurologist      SECONDARY DISCHARGE DIAGNOSES  Diagnosis: Benign essential HTN  Assessment and Plan of Treatment:     Diagnosis: Hyperlipidemia, unspecified  Assessment and Plan of Treatment:     Diagnosis: Stage 3 chronic kidney disease  Assessment and Plan of Treatment:     Diagnosis: CAD (coronary artery disease)  Assessment and Plan of Treatment:

## 2022-12-13 NOTE — PROGRESS NOTE ADULT - ASSESSMENT
73 yo male with possible micturition syncope. Questionable history of CAD on CTA (source unclear), no active ischemic changes on ECG.  Echo shows normal LVEF but has moderate MR. Carotid study unremarkable.  H/O HTN, HLD.    Admit for 24 hours observation and tele monitoring.  Lengthy syncope doubt ischemic or arrhythmia, neuro evaluation to r/o seizure?  Ambulate and assess for orthostatics.  Out patient ischemia evaluation if ECG and trops normal. 73 yo male with possible micturition syncope. Questionable history of CAD on CTA (source unclear), no active ischemic changes on ECG.  Echo shows normal LVEF but has moderate MR/AR.   Carotid study unremarkable.  H/O HTN, HLD, prostate Ca    -No events on tele, remains in sinus  -?neuro evaluation   -assess for orthostatics.  -Out patient ischemia w/u 71 yo male with possible micturition syncope. Questionable history of CAD on CTA (source unclear)  Echo shows normal LVEF but has moderate MR/AR.   Carotid study unremarkable.  H/O HTN, HLD, prostate Ca  Trop 23/40/43    -No events on tele, remains in sinus  -?neuro evaluation   -assess for orthostatics.  -Out patient ischemia w/u

## 2022-12-13 NOTE — PROGRESS NOTE ADULT - SUBJECTIVE AND OBJECTIVE BOX
Patient is a 72y old  Male who presents with a chief complaint of syncope (12 Dec 2022 13:24)    PAST MEDICAL & SURGICAL HISTORY:  HTN (hypertension)    Gout    HLD (hyperlipidemia)    prostate cancer    CAD    INTERVAL HISTORY: no acute distress   	  MEDICATIONS:  MEDICATIONS  (STANDING):  amLODIPine   Tablet 2.5 milliGRAM(s) Oral daily  aspirin enteric coated 81 milliGRAM(s) Oral daily  atorvastatin 40 milliGRAM(s) Oral at bedtime  lactated ringers. 1000 milliLiter(s) (125 mL/Hr) IV Continuous <Continuous>  metoprolol succinate  milliGRAM(s) Oral daily    MEDICATIONS  (PRN):  acetaminophen     Tablet .. 650 milliGRAM(s) Oral every 6 hours PRN Mild Pain (1 - 3), Moderate Pain (4 - 6)  melatonin 3 milliGRAM(s) Oral at bedtime PRN Insomnia    Vitals:  T(F): 97.6 (12-13-22 @ 11:45), Max: 99.8 (12-13-22 @ 03:07)  HR: 65 (12-13-22 @ 11:45) (61 - 105)  BP: 138/74 (12-13-22 @ 11:45) (100/66 - 139/75)  RR: 18 (12-13-22 @ 11:45) (12 - 18)  SpO2: 96% (12-13-22 @ 11:45) (95% - 100%)    Weight (kg): 72.6 (12-12 @ 07:07)  BMI (kg/m2): 24.3 (12-12 @ 07:07)    PHYSICAL EXAM:  Neuro: Awake, responsive  CV: S1 S2 RRR  Lungs: CTABL  GI: Soft, BS +, ND, NT  Extremities: No edema    TELEMETRY:     RADIOLOGY: < from: US Duplex Carotid Arteries Complete, Bilateral (12.12.22 @ 15:00) >    IMPRESSION: No significant hemodynamic stenosis of either carotid artery.    Measurement of carotid stenosis is based on velocity parameters that   correlate the residual internal carotid diameter with that of the more   distal vessel in accordance with a method such as the North American   Symptomatic Carotid Endarterectomy Trial (NASCET).    < end of copied text >  < from: CT Head No Cont (12.12.22 @ 09:28) >  IMPRESSION: No acute hemorrhage mass or mass effect.    < end of copied text >  < from: Xray Chest 1 View- PORTABLE-Urgent (Xray Chest 1 View- PORTABLE-Urgent .) (12.12.22 @ 08:37) >  IMPRESSION:   No radiographic evidence of active chest disease.    < end of copied text >    DIAGNOSTIC TESTING:    [x ] Echocardiogram: < from: TTE Echo Complete w/o Contrast w/ Doppler (12.12.22 @ 17:37) >    PHYSICIAN INTERPRETATION:  Left Ventricle: Normal left ventricular size and wall thicknesses, with   normal systolic and diastolic function.  Global LV systolic function was normal. Left ventricular ejection   fraction, by visual estimation, is 55 to 60%.  Right Ventricle: Normal right ventricular size and function.  Left Atrium: Moderately enlarged left atrium.  Right Atrium: The right atrium is normal in size.  Pericardium: There is no evidence of pericardial effusion.  Mitral Valve: Structurally normal mitral valve, with normal leaflet   excursion. Moderate mitral valve regurgitation is seen.  Tricuspid Valve: Structurally normal tricuspid valve, with normal leaflet   excursion. Mild tricuspid regurgitation is visualized.  Aortic Valve: Normal trileaflet aortic valve with normal opening.   Moderate aortic valve regurgitation is seen.  Pulmonic Valve: Structurally normal pulmonic valve, with normal leaflet   excursion. Mild pulmonic valve regurgitation.  Aorta: The aortic root and ascending aorta are structurally normal, with   no evidence of dilitation.  Pulmonary Artery: The main pulmonary artery is normal in size.      Summary:   1. Left ventricular ejection fraction, by visual estimation, is 55 to   60%.   2. Normal global left ventricular systolic function.   3. Moderately enlarged left atrium.   4. Moderate mitral valve regurgitation.   5. Mild tricuspid regurgitation.   6. Moderate aortic regurgitation.   7. Mild pulmonic valve regurgitation.    < end of copied text >    LABS:	 	    CARDIAC MARKERS:  Troponin I, High Sensitivity Result: 43.4 ng/L (12-13 @ 05:30)  Troponin I, High Sensitivity Result: 40.0 ng/L (12-12 @ 14:55)  Troponin I, High Sensitivity Result: 23.2 ng/L (12-12 @ 10:40)    13 Dec 2022 05:30    140    |  108    |  19     ----------------------------<  73     4.0     |  27     |  1.43   12 Dec 2022 10:40    142    |  108    |  26     ----------------------------<  114    4.7     |  29     |  1.67     Ca    8.4        13 Dec 2022 05:30  Phos  3.3       13 Dec 2022 05:30  Mg     2.0       13 Dec 2022 05:30    TPro  6.3    /  Alb  3.2    /  TBili  1.2    /  DBili  x      /  AST  68     /  ALT  22     /  AlkPhos  62     13 Dec 2022 05:30                        12.6   7.92  )-----------( 190      ( 13 Dec 2022 05:30 )             38.9 ,                       13.1   8.37  )-----------( 185      ( 12 Dec 2022 10:40 )             40.6   Lipid Profile: 12-13 @ 05:30  HDL/Total Cholesterol: --  HDL Chol:              46 mg/dL  Serum Chol:            162 mg/dL  Direct LDL:            --  Triglycerides:         86 mg/dL    INR: 0.93 ratio (12-12 @ 10:40)           Patient is a 72y old  Male who presents with a chief complaint of syncope (12 Dec 2022 13:24)    PAST MEDICAL & SURGICAL HISTORY:  HTN (hypertension)    Gout    HLD (hyperlipidemia)    prostate cancer    CAD    INTERVAL HISTORY: no acute distress, denies any chest pain or sob   	  MEDICATIONS:  MEDICATIONS  (STANDING):  amLODIPine   Tablet 2.5 milliGRAM(s) Oral daily  aspirin enteric coated 81 milliGRAM(s) Oral daily  atorvastatin 40 milliGRAM(s) Oral at bedtime  lactated ringers. 1000 milliLiter(s) (125 mL/Hr) IV Continuous <Continuous>  metoprolol succinate  milliGRAM(s) Oral daily    MEDICATIONS  (PRN):  acetaminophen     Tablet .. 650 milliGRAM(s) Oral every 6 hours PRN Mild Pain (1 - 3), Moderate Pain (4 - 6)  melatonin 3 milliGRAM(s) Oral at bedtime PRN Insomnia    Vitals:  T(F): 97.6 (12-13-22 @ 11:45), Max: 99.8 (12-13-22 @ 03:07)  HR: 65 (12-13-22 @ 11:45) (61 - 105)  BP: 138/74 (12-13-22 @ 11:45) (100/66 - 139/75)  RR: 18 (12-13-22 @ 11:45) (12 - 18)  SpO2: 96% (12-13-22 @ 11:45) (95% - 100%)    Weight (kg): 72.6 (12-12 @ 07:07)  BMI (kg/m2): 24.3 (12-12 @ 07:07)    PHYSICAL EXAM:  Neuro: Awake, responsive  CV: S1 S2 RRR + SM  Lungs: CTABL  GI: Soft, BS +, ND, NT  Extremities: No edema    TELEMETRY: sinus     RADIOLOGY: < from: US Duplex Carotid Arteries Complete, Bilateral (12.12.22 @ 15:00) >    IMPRESSION: No significant hemodynamic stenosis of either carotid artery.    Measurement of carotid stenosis is based on velocity parameters that   correlate the residual internal carotid diameter with that of the more   distal vessel in accordance with a method such as the North American   Symptomatic Carotid Endarterectomy Trial (NASCET).    < end of copied text >  < from: CT Head No Cont (12.12.22 @ 09:28) >  IMPRESSION: No acute hemorrhage mass or mass effect.    < end of copied text >  < from: Xray Chest 1 View- PORTABLE-Urgent (Xray Chest 1 View- PORTABLE-Urgent .) (12.12.22 @ 08:37) >  IMPRESSION:   No radiographic evidence of active chest disease.    < end of copied text >    DIAGNOSTIC TESTING:    [x ] Echocardiogram: < from: TTE Echo Complete w/o Contrast w/ Doppler (12.12.22 @ 17:37) >    PHYSICIAN INTERPRETATION:  Left Ventricle: Normal left ventricular size and wall thicknesses, with   normal systolic and diastolic function.  Global LV systolic function was normal. Left ventricular ejection   fraction, by visual estimation, is 55 to 60%.  Right Ventricle: Normal right ventricular size and function.  Left Atrium: Moderately enlarged left atrium.  Right Atrium: The right atrium is normal in size.  Pericardium: There is no evidence of pericardial effusion.  Mitral Valve: Structurally normal mitral valve, with normal leaflet   excursion. Moderate mitral valve regurgitation is seen.  Tricuspid Valve: Structurally normal tricuspid valve, with normal leaflet   excursion. Mild tricuspid regurgitation is visualized.  Aortic Valve: Normal trileaflet aortic valve with normal opening.   Moderate aortic valve regurgitation is seen.  Pulmonic Valve: Structurally normal pulmonic valve, with normal leaflet   excursion. Mild pulmonic valve regurgitation.  Aorta: The aortic root and ascending aorta are structurally normal, with   no evidence of dilitation.  Pulmonary Artery: The main pulmonary artery is normal in size.      Summary:   1. Left ventricular ejection fraction, by visual estimation, is 55 to   60%.   2. Normal global left ventricular systolic function.   3. Moderately enlarged left atrium.   4. Moderate mitral valve regurgitation.   5. Mild tricuspid regurgitation.   6. Moderate aortic regurgitation.   7. Mild pulmonic valve regurgitation.    < end of copied text >    LABS:	 	    CARDIAC MARKERS:  Troponin I, High Sensitivity Result: 43.4 ng/L (12-13 @ 05:30)  Troponin I, High Sensitivity Result: 40.0 ng/L (12-12 @ 14:55)  Troponin I, High Sensitivity Result: 23.2 ng/L (12-12 @ 10:40)    13 Dec 2022 05:30    140    |  108    |  19     ----------------------------<  73     4.0     |  27     |  1.43   12 Dec 2022 10:40    142    |  108    |  26     ----------------------------<  114    4.7     |  29     |  1.67     Ca    8.4        13 Dec 2022 05:30  Phos  3.3       13 Dec 2022 05:30  Mg     2.0       13 Dec 2022 05:30    TPro  6.3    /  Alb  3.2    /  TBili  1.2    /  DBili  x      /  AST  68     /  ALT  22     /  AlkPhos  62     13 Dec 2022 05:30                        12.6   7.92  )-----------( 190      ( 13 Dec 2022 05:30 )             38.9 ,                       13.1   8.37  )-----------( 185      ( 12 Dec 2022 10:40 )             40.6   Lipid Profile: 12-13 @ 05:30  HDL/Total Cholesterol: --  HDL Chol:              46 mg/dL  Serum Chol:            162 mg/dL  Direct LDL:            --  Triglycerides:         86 mg/dL    INR: 0.93 ratio (12-12 @ 10:40)

## 2022-12-13 NOTE — DISCHARGE NOTE NURSING/CASE MANAGEMENT/SOCIAL WORK - PATIENT PORTAL LINK FT
You can access the FollowMyHealth Patient Portal offered by [facility] by registering at the following website: [email or web address]. By joining [employer]’s FollowMyHealth portal, you will also be able to view your health information using other applications (apps) compatible with our system.

## 2022-12-13 NOTE — DISCHARGE NOTE PROVIDER - CARE PROVIDER_API CALL
Nela Bailey  NEUROLOGY  [address] [place] [address]  Phone: [phone]  Fax: [phone]  Follow Up Time:     Misael Brenner)  Cardiology; Cardiovascular Disease; Nuclear Cardiology  [address] [facility], [place] [address]  Phone: [phone]  Fax: [phone]  Follow Up Time:

## 2022-12-13 NOTE — DISCHARGE NOTE PROVIDER - ATTENDING DISCHARGE PHYSICAL EXAMINATION:
GENERAL: NAD, well-groomed, well-developed  HEAD:  Atraumatic, Normocephalic  EYES: EOMI, PERRLA, conjunctiva and sclera clear  ENMT: No tonsillar erythema, exudates, or enlargement; Moist mucous membranes, Good dentition, No lesions  NECK: Supple, No JVD, Normal thyroid  NERVOUS SYSTEM:  Alert & Oriented X3, Good concentration; Motor Strength 5/5 B/L upper and lower extremities; DTRs 2+ intact and symmetric  CHEST/LUNG: Clear to percussion bilaterally; No rales, rhonchi, wheezing, or rubs  HEART: Regular rate and rhythm; No murmurs, rubs, or gallops  ABDOMEN: Soft, Nontender, Nondistended; Bowel sounds present  EXTREMITIES:  2+ Peripheral Pulses, No clubbing, cyanosis, or edema  LYMPH: No lymphadenopathy notedRECTAL: No masses, prostate normal size and smooth, Guiac negative   BREAST: No palpatble masses, skin no lesions no retractions, no discharages. adenexa no palpable masses noted   GYN: uterus normal size, adenexa no palpable masses, no CMT, no uterine discharge   SKIN: No rashes or lesions

## 2022-12-13 NOTE — ED ADULT NURSE REASSESSMENT NOTE - NS ED NURSE REASSESS COMMENT FT1
Pt received lying in bed in no acute distress. Alert and responsive to all stimuli. Admitted and awaiting bed assignment. Pending doctor's orders. Safety maintained. NEW Ramsey. RN

## 2022-12-13 NOTE — DISCHARGE NOTE NURSING/CASE MANAGEMENT/SOCIAL WORK - NSDCPEFALRISK_GEN_ALL_CORE
For information on Fall & Injury Prevention, visit: [email or web address] OR  [email or web address] OR  https://www.cdc.gov/steadi/patient.html

## 2022-12-14 LAB
CULTURE RESULTS: SIGNIFICANT CHANGE UP
SPECIMEN SOURCE: SIGNIFICANT CHANGE UP

## 2022-12-14 RX ORDER — AMLODIPINE BESYLATE 2.5 MG/1
1 TABLET ORAL
Qty: 0 | Refills: 0 | DISCHARGE

## 2022-12-14 RX ORDER — RAMIPRIL 5 MG
1 CAPSULE ORAL
Qty: 0 | Refills: 0 | DISCHARGE

## 2022-12-14 RX ORDER — METOPROLOL TARTRATE 50 MG
1 TABLET ORAL
Qty: 0 | Refills: 0 | DISCHARGE

## 2022-12-14 RX ORDER — METOPROLOL TARTRATE 50 MG
2 TABLET ORAL
Qty: 0 | Refills: 0 | DISCHARGE

## 2022-12-14 RX ORDER — ROSUVASTATIN CALCIUM 5 MG/1
1 TABLET ORAL
Qty: 0 | Refills: 0 | DISCHARGE

## 2022-12-15 ENCOUNTER — NON-APPOINTMENT (OUTPATIENT)
Age: 72
End: 2022-12-15

## 2022-12-15 ENCOUNTER — APPOINTMENT (OUTPATIENT)
Dept: CARDIOLOGY | Facility: CLINIC | Age: 72
End: 2022-12-15
Payer: MEDICARE

## 2022-12-15 VITALS
WEIGHT: 159 LBS | DIASTOLIC BLOOD PRESSURE: 70 MMHG | HEART RATE: 96 BPM | HEIGHT: 68 IN | TEMPERATURE: 97.8 F | BODY MASS INDEX: 24.1 KG/M2 | SYSTOLIC BLOOD PRESSURE: 130 MMHG | OXYGEN SATURATION: 98 %

## 2022-12-15 PROBLEM — E78.5 HYPERLIPIDEMIA, UNSPECIFIED: Chronic | Status: ACTIVE | Noted: 2022-12-12

## 2022-12-15 PROBLEM — M10.9 GOUT, UNSPECIFIED: Chronic | Status: ACTIVE | Noted: 2022-12-12

## 2022-12-15 PROBLEM — I10 ESSENTIAL (PRIMARY) HYPERTENSION: Chronic | Status: ACTIVE | Noted: 2022-12-12

## 2022-12-15 PROCEDURE — 93000 ELECTROCARDIOGRAM COMPLETE: CPT

## 2022-12-15 PROCEDURE — 99214 OFFICE O/P EST MOD 30 MIN: CPT

## 2022-12-15 PROCEDURE — 93242 EXT ECG>48HR<7D RECORDING: CPT

## 2022-12-15 RX ORDER — PREDNISONE 5 MG/1
5 TABLET ORAL
Qty: 100 | Refills: 0 | Status: DISCONTINUED | COMMUNITY
Start: 2019-04-30 | End: 2022-12-15

## 2022-12-15 NOTE — HISTORY OF PRESENT ILLNESS
[FreeTextEntry1] : 72-year-old male with known history of hypertension, hyperlipidemia and CAD?  Who was seen at [facility] emergency room on 12/12/2022 after syncopal episode.  Patient recalls going to the bathroom and post urination lost consciousness; his significant other heard a thump in the bathroom and when she arrived patient was trying to get up but appeared confused for approximately 30 minutes.  No prior history of seizure.  Has been having intermittent lightheadedness.  No prior history of myocardial infarction however a CT angiogram done more than a year ago reportedly showed 50 to 70% occlusion in the mid LAD for which no intervention was entertained; patient was offered a cath which he declined.\par \par He is generally very active, rides a bike with significant exercise tolerance.  No recent chest pain, palpitations noted.  No recent change in his blood pressure medications.  Denies any history of diabetes.  Actively smokes marijuana uses alcohol occasionally no tobacco.  Labs in the hospital showed no evidence of ischemia he did have CKD grade 3 with a creatinine of 1.67.\par \par Since discharge he denies any symptomatology.

## 2022-12-15 NOTE — CARDIOLOGY SUMMARY
[de-identified] : 12/15/22, Sinus  Rhythm, -ST depression  -Nondiagnostic.  No significant changes in EKG as compared to previous EKGs from 12/12/2022 as well as 10/13/2011. [de-identified] : 12/14/2022, EF 55 to 60%, moderately enlarged left atrium, moderate MR, mild TR, moderate AI, mild MA.

## 2022-12-15 NOTE — DISCUSSION/SUMMARY
[Patient] : the patient [___ Year(s)] : in [unfilled] year(s) [FreeTextEntry1] : 72-year-old male with what appears to be micturition syncope multiple risk factors for cardiac disease including suspicion of LAD disease noted on a CTA done a more than a year ago.  Patient never had stress test to assess whether or not there is any ischemia.  Patient uninterested in any invasive diagnostic testing at this time unless we can prove there is evidence of ischemia.  Will schedule for regular stress test and will place 7-day event monitor to rule out dysrhythmia.  If the above testing shows no evidence of any significant pathology, advised that he be cautious, increase p.o. fluid, monitor for signs of postural symptoms.  Patient scheduled follow-up with neurology as well.  Continue hypertension management with his primary care physician. [EKG obtained to assist in diagnosis and management of assessed problem(s)] : EKG obtained to assist in diagnosis and management of assessed problem(s)

## 2022-12-15 NOTE — PHYSICAL EXAM
[Well Developed] : well developed [Well Nourished] : well nourished [No Acute Distress] : no acute distress [Normal Conjunctiva] : normal conjunctiva [Normal Venous Pressure] : normal venous pressure [No Carotid Bruit] : no carotid bruit [Normal S1, S2] : normal S1, S2 [No Rub] : no rub [No Gallop] : no gallop [Clear Lung Fields] : clear lung fields [Good Air Entry] : good air entry [No Respiratory Distress] : no respiratory distress  [Soft] : abdomen soft [Non Tender] : non-tender [No Masses/organomegaly] : no masses/organomegaly [Normal Bowel Sounds] : normal bowel sounds [Normal Gait] : normal gait [No Edema] : no edema [No Cyanosis] : no cyanosis [No Clubbing] : no clubbing [No Varicosities] : no varicosities [No Rash] : no rash [No Skin Lesions] : no skin lesions [Moves all extremities] : moves all extremities [No Focal Deficits] : no focal deficits [Normal Speech] : normal speech [Alert and Oriented] : alert and oriented [Normal memory] : normal memory [de-identified] : 2/6 systolic ejection murmur heard best at left lower sternal border.

## 2023-01-17 ENCOUNTER — APPOINTMENT (OUTPATIENT)
Dept: CARDIOLOGY | Facility: CLINIC | Age: 73
End: 2023-01-17

## 2023-03-02 ENCOUNTER — APPOINTMENT (OUTPATIENT)
Dept: CARDIOLOGY | Facility: CLINIC | Age: 73
End: 2023-03-02
Payer: MEDICARE

## 2023-03-02 PROCEDURE — 93015 CV STRESS TEST SUPVJ I&R: CPT

## 2023-03-08 ENCOUNTER — NON-APPOINTMENT (OUTPATIENT)
Age: 73
End: 2023-03-08

## 2023-03-08 ENCOUNTER — APPOINTMENT (OUTPATIENT)
Dept: CARDIOLOGY | Facility: CLINIC | Age: 73
End: 2023-03-08
Payer: MEDICARE

## 2023-03-08 VITALS
WEIGHT: 164 LBS | OXYGEN SATURATION: 82 % | BODY MASS INDEX: 24.86 KG/M2 | SYSTOLIC BLOOD PRESSURE: 122 MMHG | HEIGHT: 68 IN | DIASTOLIC BLOOD PRESSURE: 68 MMHG | HEART RATE: 98 BPM

## 2023-03-08 PROCEDURE — 99214 OFFICE O/P EST MOD 30 MIN: CPT

## 2023-03-08 RX ORDER — CEFIXIME 400 MG/1
400 TABLET ORAL
Qty: 1 | Refills: 0 | Status: DISCONTINUED | COMMUNITY
Start: 2019-04-04 | End: 2023-03-08

## 2023-03-08 NOTE — DISCUSSION/SUMMARY
[Patient] : the patient [___ Year(s)] : in [unfilled] year(s) [FreeTextEntry1] : 72-year-old male with what appears to be micturition syncope multiple risk factors for cardiac disease including suspicion of LAD disease noted on a CTA done a more than a year ago.  \par Current stress test shows evidence of likely myocardial ischemia at normal exercise tolerance.\par \par I had a lengthy discussion with the patient regarding my analysis of the likelihood that he has significant underlying coronary disease.  He is in denial that this represents heart disease because he feels that he can exercise freely and is asymptomatic but admittedly stated that the stress test was very difficult for him.  I explained to him that it is likely that he just never reaches 85% of his maximum predicted heart rate and this is the reason why he remains asymptomatic.  He is still unsure of proceeding with any interventions at this time.\par \par I did advise starting 81 mg of enteric-coated aspirin and reevaluate his lipid profile; his goal should be an LDL of 70 given the likelihood of underlying atherosclerotic heart disease.  He wished to discuss these issues with you prior to proceeding with any interventions.  Patient was told that if he should decide that he wants to go ahead with a catheterization that he can reach out to our office at any time.  If not the patient will be seen in 3 months for surveillance follow-up.

## 2023-03-08 NOTE — CARDIOLOGY SUMMARY
[de-identified] : 12/15/22, Sinus  Rhythm, -ST depression  -Nondiagnostic.  No significant changes in EKG as compared to previous EKGs from 12/12/2022 as well as 10/13/2011. [de-identified] : 3/2/2023,1. Normal exercise tolerance. Denied any chest pain during the study. \par 2. Significant ST segment depressions seen in inferolateral leads at peak exercise, returned to baseline within three minutes of recovery. Findings suggestive of myocardial ischemia. \par  [de-identified] : 12/14/2022, EF 55 to 60%, moderately enlarged left atrium, moderate MR, mild TR, moderate AI, mild NC.

## 2023-03-08 NOTE — HISTORY OF PRESENT ILLNESS
[FreeTextEntry1] : 72-year-old male with known history of hypertension, hyperlipidemia and CAD?  Who was seen at [facility] emergency room on 12/12/2022 after syncopal episode.  Patient recalls going to the bathroom and post urination lost consciousness; his significant other heard a thump in the bathroom and when she arrived patient was trying to get up but appeared confused for approximately 30 minutes.  No prior history of seizure.  Has been having intermittent lightheadedness.  No prior history of myocardial infarction however a CT angiogram done more than a year ago reportedly showed 50 to 70% occlusion in the mid LAD for which no intervention was entertained; patient was offered a cath which he declined.\par \par He is generally very active, rides a bike with significant exercise tolerance.  No recent chest pain, palpitations noted.  No recent change in his blood pressure medications.  Denies any history of diabetes.  Actively smokes marijuana uses alcohol occasionally no tobacco.  Labs in the hospital showed no evidence of ischemia he did have CKD grade 3 with a creatinine of 1.67.\par \par Patient here today to review results of exercise stress test from 3/2/2023.\par Since discharge he denies any symptomatology.

## 2023-06-21 ENCOUNTER — NON-APPOINTMENT (OUTPATIENT)
Age: 73
End: 2023-06-21

## 2023-06-21 ENCOUNTER — APPOINTMENT (OUTPATIENT)
Dept: CARDIOLOGY | Facility: CLINIC | Age: 73
End: 2023-06-21
Payer: MEDICARE

## 2023-06-21 VITALS
DIASTOLIC BLOOD PRESSURE: 60 MMHG | SYSTOLIC BLOOD PRESSURE: 140 MMHG | RESPIRATION RATE: 97 BRPM | HEART RATE: 65 BPM | HEIGHT: 68 IN

## 2023-06-21 DIAGNOSIS — R55 SYNCOPE AND COLLAPSE: ICD-10-CM

## 2023-06-21 PROCEDURE — 99214 OFFICE O/P EST MOD 30 MIN: CPT

## 2023-06-21 PROCEDURE — 93000 ELECTROCARDIOGRAM COMPLETE: CPT

## 2023-06-21 RX ORDER — TOLTERODINE TARTRATE 4 MG/1
4 CAPSULE, EXTENDED RELEASE ORAL
Qty: 30 | Refills: 0 | Status: DISCONTINUED | COMMUNITY
Start: 2019-05-24 | End: 2023-06-21

## 2023-06-21 RX ORDER — BICALUTAMIDE 50 MG/1
50 TABLET ORAL
Qty: 30 | Refills: 0 | Status: DISCONTINUED | COMMUNITY
Start: 2019-02-22 | End: 2023-06-21

## 2023-06-21 NOTE — CARDIOLOGY SUMMARY
[de-identified] : 6/21/23, Sinus  Rhythm -Nonspecific ST depression  -Nondiagnostic. \par 12/15/22, Sinus  Rhythm, -ST depression  -Nondiagnostic.  No significant changes in EKG as compared to previous EKGs from 12/12/2022 as well as 10/13/2011. [de-identified] : 3/2/2023,1. Normal exercise tolerance. Denied any chest pain during the study. \par 2. Significant ST segment depressions seen in inferolateral leads at peak exercise, returned to baseline within three minutes of recovery. Findings suggestive of myocardial ischemia. \par  [de-identified] : 12/14/2022, EF 55 to 60%, moderately enlarged left atrium, moderate MR, mild TR, moderate AI, mild NH.

## 2023-06-21 NOTE — HISTORY OF PRESENT ILLNESS
[FreeTextEntry1] : 72-year-old male with known history of hypertension, hyperlipidemia and CAD?  Who was seen at [facility] emergency room on 12/12/2022 after syncopal episode.  Patient recalls going to the bathroom and post urination lost consciousness; his significant other heard a thump in the bathroom and when she arrived patient was trying to get up but appeared confused for approximately 30 minutes.  No prior history of seizure.  Has been having intermittent lightheadedness.  No prior history of myocardial infarction however a CT angiogram done more than a year ago reportedly showed 50 to 70% occlusion in the mid LAD for which no intervention was entertained; patient was offered a cath which he declined. Stress test also did show evidence of possible myocardial ischemia; despite this patient declined any interventions. He was started on ASA prophylactically\par \par He is generally very active, rides a bike with good exercise tolerance.  No recent chest pain, palpitations noted.  No recent change in his blood pressure medications.  Denies any history of diabetes.  Actively smokes marijuana uses alcohol occasionally no tobacco.  Labs in the hospital showed no evidence of ischemia he did have CKD grade 3 with a creatinine of 1.67. No recent labs available for review.\par \par Also notes some evidence of skipped beats on his watch, but denies any feelings of skipped beats or palpitations.\par

## 2023-06-21 NOTE — DISCUSSION/SUMMARY
[Patient] : the patient [___ Year(s)] : in [unfilled] year(s) [EKG obtained to assist in diagnosis and management of assessed problem(s)] : EKG obtained to assist in diagnosis and management of assessed problem(s) [FreeTextEntry1] : 72-year-old male with what appears to be micturition syncope multiple risk factors for cardiac disease including evidence of LAD disease noted on a CTA done a more than a year ago.  \par Current stress test shows evidence of likely myocardial ischemia at normal exercise tolerance.\par \par I had a repeat discussion with the patient regarding my analysis of the likelihood that he has significant underlying coronary disease.  He is in denial that this represents heart disease because he feels that he can exercise freely and is asymptomatic but admittedly stated that the stress test was very difficult for him.  He remains steadfast in his decisiopn to defer any interventions at this time.\par \par Requestingt most recent labs from PCP; his goal should be an LDL of 70 given the likelihood of underlying atherosclerotic heart disease.  He wished to discuss these issues with you prior to proceeding with any interventions.  Also self measured BP's in 130's, would benefit from titration of Amlodipine to 5 mg daily.

## 2023-06-29 ENCOUNTER — EMERGENCY (EMERGENCY)
Facility: HOSPITAL | Age: 73
LOS: 0 days | Discharge: HOME | End: 2023-06-29
Attending: STUDENT IN AN ORGANIZED HEALTH CARE EDUCATION/TRAINING PROGRAM
Payer: MEDICARE

## 2023-06-29 VITALS
WEIGHT: 160.06 LBS | TEMPERATURE: 98 F | RESPIRATION RATE: 20 BRPM | OXYGEN SATURATION: 94 % | SYSTOLIC BLOOD PRESSURE: 127 MMHG | DIASTOLIC BLOOD PRESSURE: 83 MMHG | HEIGHT: 68 IN | HEART RATE: 92 BPM

## 2023-06-29 VITALS
TEMPERATURE: 98 F | OXYGEN SATURATION: 100 % | HEART RATE: 59 BPM | SYSTOLIC BLOOD PRESSURE: 133 MMHG | RESPIRATION RATE: 17 BRPM | DIASTOLIC BLOOD PRESSURE: 84 MMHG

## 2023-06-29 DIAGNOSIS — R56.9 UNSPECIFIED CONVULSIONS: ICD-10-CM

## 2023-06-29 DIAGNOSIS — M10.9 GOUT, UNSPECIFIED: ICD-10-CM

## 2023-06-29 DIAGNOSIS — Z79.82 LONG TERM (CURRENT) USE OF ASPIRIN: ICD-10-CM

## 2023-06-29 DIAGNOSIS — E78.5 HYPERLIPIDEMIA, UNSPECIFIED: ICD-10-CM

## 2023-06-29 DIAGNOSIS — I10 ESSENTIAL (PRIMARY) HYPERTENSION: ICD-10-CM

## 2023-06-29 DIAGNOSIS — K21.9 GASTRO-ESOPHAGEAL REFLUX DISEASE WITHOUT ESOPHAGITIS: ICD-10-CM

## 2023-06-29 LAB
ALBUMIN SERPL ELPH-MCNC: 3.3 G/DL — SIGNIFICANT CHANGE UP (ref 3.3–5)
ALP SERPL-CCNC: 66 U/L — SIGNIFICANT CHANGE UP (ref 40–120)
ALT FLD-CCNC: 28 U/L — SIGNIFICANT CHANGE UP (ref 12–78)
ANION GAP SERPL CALC-SCNC: 5 MMOL/L — SIGNIFICANT CHANGE UP (ref 5–17)
AST SERPL-CCNC: 33 U/L — SIGNIFICANT CHANGE UP (ref 15–37)
BASOPHILS # BLD AUTO: 0.06 K/UL — SIGNIFICANT CHANGE UP (ref 0–0.2)
BASOPHILS NFR BLD AUTO: 0.9 % — SIGNIFICANT CHANGE UP (ref 0–2)
BILIRUB SERPL-MCNC: 0.9 MG/DL — SIGNIFICANT CHANGE UP (ref 0.2–1.2)
BUN SERPL-MCNC: 18 MG/DL — SIGNIFICANT CHANGE UP (ref 7–23)
CALCIUM SERPL-MCNC: 8.5 MG/DL — SIGNIFICANT CHANGE UP (ref 8.5–10.1)
CHLORIDE SERPL-SCNC: 108 MMOL/L — SIGNIFICANT CHANGE UP (ref 96–108)
CO2 SERPL-SCNC: 29 MMOL/L — SIGNIFICANT CHANGE UP (ref 22–31)
CREAT SERPL-MCNC: 1.85 MG/DL — HIGH (ref 0.5–1.3)
EGFR: 38 ML/MIN/1.73M2 — LOW
EOSINOPHIL # BLD AUTO: 0.18 K/UL — SIGNIFICANT CHANGE UP (ref 0–0.5)
EOSINOPHIL NFR BLD AUTO: 2.7 % — SIGNIFICANT CHANGE UP (ref 0–6)
GLUCOSE SERPL-MCNC: 140 MG/DL — HIGH (ref 70–99)
HCT VFR BLD CALC: 44 % — SIGNIFICANT CHANGE UP (ref 39–50)
HGB BLD-MCNC: 13.9 G/DL — SIGNIFICANT CHANGE UP (ref 13–17)
IMM GRANULOCYTES NFR BLD AUTO: 0.3 % — SIGNIFICANT CHANGE UP (ref 0–0.9)
LYMPHOCYTES # BLD AUTO: 3.15 K/UL — SIGNIFICANT CHANGE UP (ref 1–3.3)
LYMPHOCYTES # BLD AUTO: 46.5 % — HIGH (ref 13–44)
MCHC RBC-ENTMCNC: 30.7 PG — SIGNIFICANT CHANGE UP (ref 27–34)
MCHC RBC-ENTMCNC: 31.6 G/DL — LOW (ref 32–36)
MCV RBC AUTO: 97.1 FL — SIGNIFICANT CHANGE UP (ref 80–100)
MONOCYTES # BLD AUTO: 0.51 K/UL — SIGNIFICANT CHANGE UP (ref 0–0.9)
MONOCYTES NFR BLD AUTO: 7.5 % — SIGNIFICANT CHANGE UP (ref 2–14)
NEUTROPHILS # BLD AUTO: 2.86 K/UL — SIGNIFICANT CHANGE UP (ref 1.8–7.4)
NEUTROPHILS NFR BLD AUTO: 42.1 % — LOW (ref 43–77)
NRBC # BLD: 0 /100 WBCS — SIGNIFICANT CHANGE UP (ref 0–0)
PLATELET # BLD AUTO: 182 K/UL — SIGNIFICANT CHANGE UP (ref 150–400)
POTASSIUM SERPL-MCNC: 4.6 MMOL/L — SIGNIFICANT CHANGE UP (ref 3.5–5.3)
POTASSIUM SERPL-SCNC: 4.6 MMOL/L — SIGNIFICANT CHANGE UP (ref 3.5–5.3)
PROT SERPL-MCNC: 6.6 GM/DL — SIGNIFICANT CHANGE UP (ref 6–8.3)
RBC # BLD: 4.53 M/UL — SIGNIFICANT CHANGE UP (ref 4.2–5.8)
RBC # FLD: 13.4 % — SIGNIFICANT CHANGE UP (ref 10.3–14.5)
SODIUM SERPL-SCNC: 142 MMOL/L — SIGNIFICANT CHANGE UP (ref 135–145)
TROPONIN I, HIGH SENSITIVITY RESULT: 22.1 NG/L — SIGNIFICANT CHANGE UP
WBC # BLD: 6.78 K/UL — SIGNIFICANT CHANGE UP (ref 3.8–10.5)
WBC # FLD AUTO: 6.78 K/UL — SIGNIFICANT CHANGE UP (ref 3.8–10.5)

## 2023-06-29 PROCEDURE — 99285 EMERGENCY DEPT VISIT HI MDM: CPT

## 2023-06-29 PROCEDURE — 70450 CT HEAD/BRAIN W/O DYE: CPT | Mod: 26,MA

## 2023-06-29 RX ORDER — LEVETIRACETAM 250 MG/1
1500 TABLET, FILM COATED ORAL ONCE
Refills: 0 | Status: DISCONTINUED | OUTPATIENT
Start: 2023-06-29 | End: 2023-06-29

## 2023-06-29 RX ORDER — LEVETIRACETAM 250 MG/1
1 TABLET, FILM COATED ORAL
Refills: 0
Start: 2023-06-29

## 2023-06-29 NOTE — ED PROVIDER NOTE - CLINICAL SUMMARY MEDICAL DECISION MAKING FREE TEXT BOX
71 y/o M presenting to the ED s/p possible seizure.  Vitals stable.  He is well appearing in NAD.  Neuro intact.  Will obtain labs and CTH.  Per wife, recent neuro work up in the past year including negative EEG    Labs and head CT WNL. Given recent negative neuro work up, patient comfortable with f/u with his neurologist outpatient. Per wife, no seizure activity on most recent EEG. 71 y/o M presenting to the ED s/p possible seizure.  Vitals stable.  He is well appearing in NAD.  Neuro intact.  Will obtain labs and CTH.  Per wife, recent neuro work up in the past year including negative EEG    Labs and head CT WNL. Given recent negative neuro work up, patient comfortable with f/u with his neurologist outpatient. Per wife, no seizure activity on most recent EEG. Does not want to start taking seizure medication. Will discharge w/ return precautions, instructed not to operate machinery, drive, or swim.

## 2023-06-29 NOTE — ED PROVIDER NOTE - OBJECTIVE STATEMENT
73 y/o M with PMH HTN, HLD, acid reflux, gout presenting to the ED s/p possible seizure. Per wife, patient was asleep when she noticed him having seizure like activity including shaking of the upper extremities. +tongue bite. Patient does not remember the episode. Per wife, patient also had a similar episode in 12/22 which was a syncopal episode with some shaking. Patient had a cardiac and neuro work up outpatient at that time. Per wife, patient had a negative EEG and they were not able to find a diagnosis. He denies chest pain, dyspnea, numbness, tingling, weakness, changes in speech and vision.

## 2023-06-29 NOTE — ED PROVIDER NOTE - PATIENT PORTAL LINK FT
You can access the FollowMyHealth Patient Portal offered by [facility] by registering at the following website: [email or web address]. By joining [employer]’s FollowMyHealth portal, you will also be able to view your health information using other applications (apps) compatible with our system.

## 2023-06-29 NOTE — ED PROVIDER NOTE - PROGRESS NOTE DETAILS
Discussed with Dr. Saha from neurology, recommends starting keppra as patient w/ likely second seizure. Patient does not want to start keppra until he follows up with his neurologist. He is instructed not to drive or operate machinery until cleared by his neurologist.

## 2023-06-29 NOTE — ED PROVIDER NOTE - PHYSICAL EXAMINATION
GENERAL: Awake, alert, NAD  HEENT: NC/AT, moist mucous membranes, PERRL, EOMI  LUNGS: CTAB, no wheezes or crackles   CARDIAC: RRR, no m/r/g  ABDOMEN: Soft, normal BS, non tender, non distended, no rebound, no guarding  BACK: No midline spinal tenderness, no CVA tenderness  EXT: No edema, no calf tenderness, 2+ DP pulses bilaterally, no deformities.  NEURO: A&Ox3. Moving all extremities. 5/5 strengths in all extremities.  SKIN: Warm and dry. No rash.  PSYCH: Normal affect.

## 2023-06-29 NOTE — ED PROVIDER NOTE - TOBACCO USE
nitrofurantoin monohydrate macro (MACROBID) 100 MG Oral Cap   pregabalin 300 MG Oral Cap Unknown if ever smoked

## 2023-06-29 NOTE — ED ADULT NURSE NOTE - OBJECTIVE STATEMENT
A&OX4. BIBA- witnessed seizure by gf. as per gf patient body "shaking"  for about 1 min foaming at the mouth. gf called 911 . patient A&ox4. no c/o at this time. bit right side of tongue. dried blood present. patient denies pain/HA/Dizziness/SOB/CP at this time. patient denies drinking everyday denies any trauma/ falls

## 2023-06-29 NOTE — ED ADULT NURSE REASSESSMENT NOTE - NS ED NURSE REASSESS COMMENT FT1
patient refused Keppra at this time, patient and family  given full education on importance of mediations/ seizure / risks and benefits' on taking meds MD Jimmy ASCENCIO MD at bedside at time of refusal . education on [facility]'s call bell system. patient demonstrated teachback on how to use the call bell. patient educated on the importance of call bell and prevention on falls throughout shift. Seizure precautions done on arrival. patient on a cardiac monitor

## 2023-06-29 NOTE — ED PROVIDER NOTE - NSFOLLOWUPINSTRUCTIONS_ED_ALL_ED_FT
You were seen in the ED for seizure like activity.    Your labs and CT scan did not show acute findings.    You should follow up with your neurologist in the office.    Seizure    A seizure is abnormal electrical activity in the brain; the specific cause may or may not be found. Prior to a seizure you may experience a warning sensation (aura) that may include fear, nausea, dizziness, and visual changes such as flashing lights of spots. Common symptoms during the seizure may include an altered mental status, rhythmic jerking movements, drooling, grunting, loss of bladder or bowel control, or tongue biting. After a seizure, you may feel confused and sleepy.     Do not swim, drive, operate machinery, or engage in any risky activity during which a seizure could cause further injury to you or others. Teach friends and family what to do if you HAVE a seizure which includes laying you on the ground with your head on a cushion and turning you to the side to keep your breathing passages clear in case of vomiting.    SEEK IMMEDIATE MEDICAL CARE IF YOU HAVE ANY OF THE FOLLOWING SYMPTOMS: seizure lasting over 5 minutes, not waking up or persistent altered mental status after the seizure, or more frequent or worsening seizures.

## 2023-12-20 ENCOUNTER — NON-APPOINTMENT (OUTPATIENT)
Age: 73
End: 2023-12-20

## 2023-12-20 ENCOUNTER — APPOINTMENT (OUTPATIENT)
Dept: CARDIOLOGY | Facility: CLINIC | Age: 73
End: 2023-12-20
Payer: MEDICARE

## 2023-12-20 VITALS
SYSTOLIC BLOOD PRESSURE: 140 MMHG | HEART RATE: 79 BPM | WEIGHT: 168 LBS | OXYGEN SATURATION: 97 % | BODY MASS INDEX: 25.46 KG/M2 | HEIGHT: 68 IN | DIASTOLIC BLOOD PRESSURE: 80 MMHG

## 2023-12-20 DIAGNOSIS — E78.5 HYPERLIPIDEMIA, UNSPECIFIED: ICD-10-CM

## 2023-12-20 DIAGNOSIS — R93.89 ABNORMAL FINDINGS ON DIAGNOSTIC IMAGING OF OTHER SPECIFIED BODY STRUCTURES: ICD-10-CM

## 2023-12-20 DIAGNOSIS — R94.39 ABNORMAL RESULT OF OTHER CARDIOVASCULAR FUNCTION STUDY: ICD-10-CM

## 2023-12-20 DIAGNOSIS — I10 ESSENTIAL (PRIMARY) HYPERTENSION: ICD-10-CM

## 2023-12-20 PROCEDURE — 99213 OFFICE O/P EST LOW 20 MIN: CPT

## 2023-12-20 PROCEDURE — 93000 ELECTROCARDIOGRAM COMPLETE: CPT

## 2023-12-20 RX ORDER — LEVETIRACETAM 1000 MG/1
1000 TABLET, FILM COATED ORAL TWICE DAILY
Qty: 180 | Refills: 3 | Status: ACTIVE | COMMUNITY

## 2023-12-20 RX ORDER — ASPIRIN 81 MG
81 TABLET, DELAYED RELEASE (ENTERIC COATED) ORAL
Refills: 0 | Status: ACTIVE | COMMUNITY

## 2023-12-20 NOTE — HISTORY OF PRESENT ILLNESS
[FreeTextEntry1] : 73-year-old male with known history of hypertension, hyperlipidemia and CAD?  Seen at [facility] emergency room on 12/12/2022 after syncopal episode.  Patient recalls going to the bathroom and post urination lost consciousness; his significant other heard a thump in the bathroom and when she arrived patient was trying to get up but appeared confused for approximately 30 minutes.  No prior history of seizure but c/o intermittent lightheadedness.    No prior history of myocardial infarction, however a CT angiogram done in 2021 showed 50 to 70% occlusion in the mid LAD for which no intervention was entertained; patient was offered a cath which he declined. Stress test also did show evidence of possible myocardial ischemia; despite this patient declined any interventions and has been treated only with ASA by his choice.  He is generally very active, rides a bike with good exercise tolerance.  No recent chest pain, palpitations noted.  No recent change in his blood pressure medications.  Denies any history of diabetes.  Actively smokes marijuana uses alcohol occasionally no tobacco.  Labs in the hospital showed no evidence of ischemia he did have CKD grade 3 with a (stable) creatinine of 1.5-1.6. Reviewed labs with patient from 11/23, increased LDL>110 noted.  Forgot to take AM BP meds but tells me his self measured BP's are generally in 120's.

## 2023-12-20 NOTE — CARDIOLOGY SUMMARY
[de-identified] : 12/20/23, 6/21/23, Sinus  Rhythm -Nonspecific ST depression  -Nondiagnostic.  12/15/22, Sinus  Rhythm, -ST depression  -Nondiagnostic.  No significant changes in EKG as compared to previous EKGs from 12/12/2022 as well as 10/13/2011. [de-identified] : 3/2/2023,1. Normal exercise tolerance. Denied any chest pain during the study. \par  2. Significant ST segment depressions seen in inferolateral leads at peak exercise, returned to baseline within three minutes of recovery. Findings suggestive of myocardial ischemia. \par   [de-identified] : 12/14/2022, EF 55 to 60%, moderately enlarged left atrium, moderate MR, mild TR, moderate AI, mild VA.

## 2023-12-20 NOTE — DISCUSSION/SUMMARY
[Patient] : the patient [___ Year(s)] : in [unfilled] year(s) [FreeTextEntry1] : 73-year-old male with what appeared to be episode of micturition syncope.  Multiple risk factors for cardiac disease including evidence of LAD disease noted on a CTA and stress test with evidence of likely myocardial ischemia at normal exercise tolerance.  I had a repeat discussion with the patient regarding my analysis of the likelihood that he has significant underlying coronary disease.  He is in denial that this represents heart disease because he feels that he can exercise freely.  He continues to defer any interventions at this time.  Requesting most recent labs from PCP; his goal should be an LDL of 70 given the likelihood of underlying atherosclerotic heart disease.  He blames his recent LDL rise on dietary indiscretion and promises to do better. Reiterated need to reduce LDL as cornerstone of tx. [EKG obtained to assist in diagnosis and management of assessed problem(s)] : EKG obtained to assist in diagnosis and management of assessed problem(s)

## 2024-07-01 ENCOUNTER — APPOINTMENT (OUTPATIENT)
Dept: CARDIOLOGY | Facility: CLINIC | Age: 74
End: 2024-07-01

## 2024-11-13 ENCOUNTER — APPOINTMENT (OUTPATIENT)
Dept: NEUROSURGERY | Facility: CLINIC | Age: 74
End: 2024-11-13
Payer: MEDICARE

## 2024-11-13 ENCOUNTER — NON-APPOINTMENT (OUTPATIENT)
Age: 74
End: 2024-11-13

## 2024-11-13 VITALS
TEMPERATURE: 98.5 F | WEIGHT: 167 LBS | OXYGEN SATURATION: 98 % | SYSTOLIC BLOOD PRESSURE: 156 MMHG | RESPIRATION RATE: 16 BRPM | BODY MASS INDEX: 25.31 KG/M2 | DIASTOLIC BLOOD PRESSURE: 98 MMHG | HEART RATE: 83 BPM | HEIGHT: 68 IN

## 2024-11-13 DIAGNOSIS — G93.9 DISORDER OF BRAIN, UNSPECIFIED: ICD-10-CM

## 2024-11-13 DIAGNOSIS — R56.9 UNSPECIFIED CONVULSIONS: ICD-10-CM

## 2024-11-13 PROCEDURE — 99203 OFFICE O/P NEW LOW 30 MIN: CPT
